# Patient Record
Sex: FEMALE | Race: WHITE | Employment: UNEMPLOYED | URBAN - METROPOLITAN AREA
[De-identification: names, ages, dates, MRNs, and addresses within clinical notes are randomized per-mention and may not be internally consistent; named-entity substitution may affect disease eponyms.]

---

## 2017-03-15 PROBLEM — G44.40 ANALGESIC REBOUND HEADACHE: Status: ACTIVE | Noted: 2017-03-15

## 2017-03-15 PROBLEM — Z79.899 ENCOUNTER FOR MEDICATION MANAGEMENT: Status: ACTIVE | Noted: 2017-03-15

## 2017-03-15 PROBLEM — T39.95XA ANALGESIC REBOUND HEADACHE: Status: ACTIVE | Noted: 2017-03-15

## 2017-03-15 PROBLEM — G89.29 CHRONIC INTRACTABLE HEADACHE: Status: ACTIVE | Noted: 2017-03-15

## 2017-03-15 PROBLEM — G43.119 INTRACTABLE MIGRAINE WITH AURA WITHOUT STATUS MIGRAINOSUS: Status: ACTIVE | Noted: 2017-03-15

## 2017-03-15 PROBLEM — R51.9 CHRONIC INTRACTABLE HEADACHE: Status: ACTIVE | Noted: 2017-03-15

## 2017-06-15 ENCOUNTER — HOSPITAL ENCOUNTER (OUTPATIENT)
Dept: MAMMOGRAPHY | Age: 52
Discharge: HOME | End: 2017-06-15
Attending: OBSTETRICS & GYNECOLOGY
Payer: COMMERCIAL

## 2017-06-15 DIAGNOSIS — Z12.39 SCREENING FOR BREAST CANCER: ICD-10-CM

## 2017-06-15 PROCEDURE — 77067 SCR MAMMO BI INCL CAD: CPT

## 2017-07-25 ENCOUNTER — HOSPITAL ENCOUNTER (OUTPATIENT)
Dept: PHYSICAL THERAPY | Age: 52
Discharge: HOME | End: 2017-07-25
Payer: COMMERCIAL

## 2017-07-25 DIAGNOSIS — G44.221 CHRONIC TENSION-TYPE HEADACHE, INTRACTABLE: ICD-10-CM

## 2017-07-25 DIAGNOSIS — M47.812 CERVICAL SPONDYLOSIS WITHOUT MYELOPATHY: ICD-10-CM

## 2017-07-25 DIAGNOSIS — M54.12 CERVICAL NEUROPATHIC PAIN: ICD-10-CM

## 2017-07-25 DIAGNOSIS — G43.119 INTRACTABLE MIGRAINE WITH AURA WITHOUT STATUS MIGRAINOSUS: ICD-10-CM

## 2017-07-25 DIAGNOSIS — M54.2 CERVICALGIA: ICD-10-CM

## 2017-07-25 DIAGNOSIS — M47.812 SPONDYLOSIS OF CERVICAL REGION WITHOUT MYELOPATHY OR RADICULOPATHY: ICD-10-CM

## 2017-07-25 DIAGNOSIS — M54.2 DISCOGENIC CERVICAL PAIN: ICD-10-CM

## 2017-07-25 DIAGNOSIS — M48.02 SPINAL STENOSIS OF CERVICAL REGION: ICD-10-CM

## 2017-07-25 DIAGNOSIS — M50.30 DDD (DEGENERATIVE DISC DISEASE), CERVICAL: ICD-10-CM

## 2017-07-25 DIAGNOSIS — R29.898 WEAKNESS OF LEFT ARM: ICD-10-CM

## 2017-07-25 PROCEDURE — 97110 THERAPEUTIC EXERCISES: CPT

## 2017-07-25 PROCEDURE — 97162 PT EVAL MOD COMPLEX 30 MIN: CPT

## 2017-07-25 NOTE — PROGRESS NOTES
Claire Lyons  : [id number] [facility],[address]  [address] [place] [address].  [phone]   [phone]        OUTPATIENT PHYSICAL THERAPY:Initial Assessment 2017      ICD-10: Treatment Diagnosis: Cervicalgia (M54.2) , Headache (R51)  Precautions/Allergies:   Pcn [penicillins] and Shellfish derived   Fall Risk Score: 0 (? 5 = High Risk)  MD Orders: Eval and Treat  MEDICAL/REFERRING DIAGNOSIS:  Intractable migraine with aura without status migrainosus [G43.119]  DDD (degenerative disc disease), cervical [M50.30]  Discogenic cervical pain [M54.2]  Cervicalgia [M54.2]  Cervical spondylosis without myelopathy [M47.812]  Spondylosis of cervical region without myelopathy or radiculopathy [M47.812]  Spinal stenosis of cervical region [M48.02]  Cervical neuropathic pain [M54.12]  Weakness of left arm [R29.898]  Chronic tension-type headache, intractable [G44.221]   DATE OF ONSET: 2 years ago   REFERRING PHYSICIAN: Yani Dash*  RETURN PHYSICIAN APPOINTMENT: tbd      INITIAL ASSESSMENT:  Ms. Asim Lozano presents to therapy with pain in the neck and shoulders as well as recurring chronic migraines that are debilitating. Pt is having trouble at times with daily activities due to the neck pain that causes the migraines to come on. Pt would benefit from skilled physical therapy for the above diagnosis to help with decreased tension, and to decrease the amount of episodes of migraines. PROBLEM LIST (Impacting functional limitations):  1. Decreased Strength  2. Decreased ADL/Functional Activities  3. Increased Pain  4. Decreased Activity Tolerance  5. Decreased Flexibility/Joint Mobility INTERVENTIONS PLANNED:  1. Cold  2. Electrical Stimulation  3. Heat  4. Home Exercise Program (HEP)  5. Manual Therapy  6. Range of Motion (ROM)  7. Therapeutic Exercise/Strengthening  8. Ultrasound (US)   TREATMENT PLAN:  Effective Dates: 17 TO 9-15-17. Frequency/Duration: 2 times a week for 7 weeks  GOALS: (Goals have been discussed and agreed upon with patient.)  Short-Term Functional Goals: Time Frame: 2 weeks   1. Ms. Meseret Caputo to be independent with HEP. 2. Pt able to tolerate maintaining proper posture in sitting without VCs and no increase in pain in the neck or shoulders for >30 mins. 3. Pt to improve cervical ROM without pain to improve driving safety. Discharge Goals: Time Frame: 7 weeks   1. Pt able to tolerate daily functional activities without pain greater than 3/10 in the neck or shoulders. 2. Pt able to sleep throughout night without waking from pain in the head or neck. 3. Pt to report less debilitating migraines to less than 2 days. Rehabilitation Potential For Stated Goals: Fair  Regarding Claire NARAYANAN Eloy's therapy, I certify that the treatment plan above will be carried out by a therapist or under their direction. Thank you for this referral,  Denia Camacho, PT, DPT       Referring Physician Signature: Amena Meier*              Date                      HISTORY:   History of Present Injury/Illness (Reason for Referral):  Pt 49 y/o F with pain in the neck and head with migraines that were debilitating causing her to go out of work on disability 2 years ago. She has been getting injections in her neck and has had therapy before 2 years ago. She is still seeing pain management for the injections. She is getting migraines about 1-2 times a week and is lasting 3-4 days at a time. She is currently out of work   Past Medical History/Comorbidities:   Ms. Meseret Caputo  has a past medical history of Anxiety attack; Borderline personality disorder (10/8/2014); Chicken pox; Cystocele; Esophageal reflux (10/8/2014); Hypercholesteremia; Measles; Migraine with aura, without mention of intractable migraine without mention of status migrainosus (10/8/2014); Migraines; Morbid obesity ([address] Mumps; Ovarian cyst; Pneumonia;  Rectocele; Stress incontinence (2007); Symptomatic menopausal or female climacteric states (10/8/2014); Unspecified essential hypertension (10/8/2014); Unspecified hypothyroidism; and Urge incontinence (2007). Ms. Johanny Carter  has a past surgical history that includes urological (2007); gastrectomy (02/06/12); hysterectomy (1998); salpingo-oophorectomy (2003); oophorectomy (2003); hysterectomy (2000); orthopaedic; and orthopaedic (04/2015). Social History/Living Environment:    Lives alone   Prior Level of Function/Work/Activity:  Working toward long term disability   Dominant Side:         RIGHT  Other Clinical Tests:          xrays and MRIs have been done previously   Previous Treatment Approaches:          Therapy 2 years ago for neck pain   Personal Factors:          Past/Current Experience:  Migraines for the past 2 years   Current Medications:    Current Outpatient Prescriptions:     verapamil ER (VERELAN PM) 100 mg capsule, Take 1 Cap by mouth nightly., Disp: 90 Cap, Rfl: 1    estradiol acetate (FEMRING) 0.05 mg/24 hr ring, Insert 1 Units into vagina every three (3) months., Disp: 1 Each, Rfl: 4    ezetimibe-simvastatin (VYTORIN) 10-20 mg per tablet, Take 1 Tab by mouth nightly., Disp: 90 Tab, Rfl: 1    SYNTHROID 125 mcg tablet, Take 1 Tab by mouth Daily (before breakfast). GEMINI, Disp: 90 Tab, Rfl: 1    potassium chloride (KLOR-CON M20) 20 mEq tablet, Take 1 Tab by mouth two (2) times a day., Disp: 180 Tab, Rfl: 1    indapamide (LOZOL) 2.5 mg tablet, Take 1 Tab by mouth every morning., Disp: 90 Tab, Rfl: 1    colestipol (COLESTID) 1 gram tablet, Take 1 Tab by mouth two (2) times a day., Disp: 180 Tab, Rfl: 1    propranolol LA (INDERAL LA) 80 mg SR capsule, Take 1 Cap by mouth daily. , Disp: 90 Cap, Rfl: 1    LORazepam (ATIVAN) 1 mg tablet, 1-2 daily prn, Disp: 45 Tab, Rfl: 5    nortriptyline (PAMELOR) 25 mg capsule, 1 po QPM  Indications: NEUROPATHIC PAIN, Disp: 90 Cap, Rfl: 1    nystatin-triamcinolone (MYCOLOG II) topical cream, Apply  to affected area two (2) times a day., Disp: 30 g, Rfl: 3    butalbital-acetaminophen-caffeine (ESGIC PLUS) -40 mg per tablet, Take 1 Tab by mouth two (2) times a day. Indications: MIGRAINE, TENSION-TYPE HEADACHE, Disp: , Rfl:     tiZANidine (ZANAFLEX) 2 mg tablet, Take 2 mg by mouth nightly as needed. Takes 1-2 tablets as needed at bedtime, Disp: , Rfl:     SUMAtriptan Succinate (SUMAVEL DOSEPRO) 6 mg/0.5 mL nfIj, Inject SC at onset of migraine. Repeat dose after 2 hrs if needed. Max 2 injections/24 hrs. Indications: MIGRAINE, Disp: 18 Syringe, Rfl: 1    eletriptan (RELPAX) 40 mg tablet, 1 po at onset of migraine and repeat after 2 hrs. If needed. Max dose 2 tabs/24 hrs., Disp: 18 Tab, Rfl: 1    HYDROcodone-acetaminophen (NORCO) 5-325 mg per tablet, Take 1 Tab by mouth every four (4) hours as needed for Pain., Disp: , Rfl:     Diclofenac Potassium (CAMBIA) 50 mg pwpk, Mix 1 packet in 1-2 oz of water and drink at onset of migraine (max: 1 packet/day)  Indications: MIGRAINE, Disp: 9 Packet, Rfl: 11    triamcinolone (NASACORT AQ) 55 mcg nasal inhaler, , Disp: , Rfl: 1    promethazine (PHENERGAN) 25 mg tablet, Take 1 Tab by mouth every eight (8) hours as needed for Nausea., Disp: 30 Tab, Rfl: 2    multivitamin (ONE A DAY) tablet, Take 1 Tab by mouth daily. Last dose 4 days ago, Disp: , Rfl:     aspirin 81 mg tablet, Take 81 mg by mouth daily. Last dose 5 days ago, Disp: , Rfl:     vitamin e (E GEMS) 1,000 unit capsule, Take 1,000 Units by mouth daily. Last dose 4 days ago, Disp: , Rfl:     calcium citrate-vitamin d3 (CITRACAL + D) 315-200 mg-unit Tab, Take 1 Tab by mouth daily (with breakfast). , Disp: , Rfl:    Date Last Reviewed:  7/25/2017   # of Personal Factors/Comorbidities that affect the Plan of Care: 3+: HIGH COMPLEXITY   EXAMINATION:   Observation/Orthostatic Postural Assessment:          Rounded shoulders, forward head (not significant)  Palpation: Tenderness over the T1 region and tension in shoulders   ROM:     Cervical       Flexion: decreased ROM with pain       Extension: WFL      R rotation: slightly limited with pain at end range      L rotation: slightly limited with pain at end range       R SB: limited and slightly painful       L SB: limited and slightly painful    Shoulder   Strength:     Cervical       Flexion: 4/5      Extension: 4-/5      R SB: 4/5      L SB: 4/5      R rotation:4/5      L rotation: 4/5   Shoulder    Special Tests:          Compression (-), Spurlings (-), Distraction (+)  Neurological Screen:        Sensation: no compliant of numbness or tingling   Functional Mobility:         Independent unless with migraine   Balance:          Good    Body Structures Involved:  1. Muscles Body Functions Affected:  1. Mental  2. Sensory/Pain  3. Neuromusculoskeletal  4. Movement Related Activities and Participation Affected:  1. General Tasks and Demands  2. Mobility  3. Community, Social and [place]   # of elements that affect the Plan of Care: 4+: HIGH COMPLEXITY   CLINICAL PRESENTATION:   Presentation: Evolving clinical presentation with changing clinical characteristics: MODERATE COMPLEXITY   CLINICAL DECISION MAKING:   Outcome Measure: Tool Used: Neck Disability Index (NDI)  Score:  Initial: 29/50  Most Recent: X/50 (Date: -- )   Interpretation of Score: The Neck Disability Index is a revised form of the Oswestry Low Back Pain Index and is designed to measure the activities of daily living in person's with neck pain. Each section is scored on a 0-5 scale, 5 representing the greatest disability. The scores of each section are added together for a total score of 50. Score 0 1-10 11-20 21-30 31-40 41-49 50   Modifier CH CI CJ CK CL CM CN     Medical Necessity:   · Patient is expected to demonstrate progress in strength and range of motion to increase independence with functional activities daily with less pain.  .  Reason for Services/Other Comments:  · Patient continues to require present interventions due to patient's inability to perform functional activities without neck pain. .   Use of outcome tool(s) and clinical judgement create a POC that gives a: Difficult prediction of patient's progress: HIGH COMPLEXITY   TREATMENT:   (In addition to Assessment/Re-Assessment sessions the following treatments were rendered)  THERAPEUTIC EXERCISE: (see below for minutes):  Exercises per grid below to improve mobility and strength. Required minimal verbal cues to promote proper body alignment. Progressed resistance, range and repetitions as indicated. MANUAL THERAPY: (see below for minutes): Joint mobilization, Soft tissue mobilization and Manipulation was utilized and necessary because of the patient's restricted joint motion, painful spasm and restricted motion of soft tissue. MODALITIES: (see below for minutes):      see chart below    MECHANICAL TRACTION: (see below for minutes): Traction was used due to the patient's neck pain and migraine headaches in order to relieve pain in or originating from his spine. Date: 7-25-17       Modalities:                                Therapeutic Exercise: 15 mins        Upper trap stretch  x15SH        Levator stretch  x15SH        Rotation stretch  5SH bilateral                                Proprioceptive Activities:                                Manual Therapy:                        Functional Activities:                                        HEP: General stretching and ROM in the neck was recommended daily. Pt was educated on TENS unit and was told to purchase more supportive pillow and to use a towel roll under neck until. Treatment/Session Assessment:  Pt was ordered a FCE but needs to complete therapy prior to being referred for the FCE. Pt was informed of this as well as purchased a TENS unit and educated on use at home.  Pt would benefit from manual therapy, stretching and strengthening for posture as well as modalities and traction. · Pain/ Symptoms: Initial:   0/10 at this moment  Post Session:  0/10  ·   Compliance with Program/Exercises: Will assess as treatment progresses. · Recommendations/Intent for next treatment session: \"Next visit will focus on advancements to more challenging activities\".   Total Treatment Duration:  PT Patient Time In/Time Out  Time In: 0200  Time Out: 0245 August Elliott, PT, DPT

## 2017-07-25 NOTE — PROGRESS NOTES
Ambulatory/Rehab Services H2 Model Falls Risk Assessment    Risk Factor Pts. ·   Confusion/Disorientation/Impulsivity  []    4 ·   Symptomatic Depression  []   2 ·   Altered Elimination  []   1 ·   Dizziness/Vertigo  []   1 ·   Gender (Male)  []   1 ·   Any administered antiepileptics (anticonvulsants):  []   2 ·   Any administered benzodiazepines:  []   1 ·   Visual Impairment (specify):  []   1 ·   Portable Oxygen Use  []   1 ·   Orthostatic ? BP  []   1 ·   History of Recent Falls (within 3 mos.)  []   5     Ability to Rise from Chair (choose one) Pts. ·   Ability to rise in a single movement  [x]   0 ·   Pushes up, successful in one attempt  []   1 ·   Multiple attempts, but successful  []   3 ·   Unable to rise without assistance  []   4   Total: (5 or greater = High Risk) 0     Falls Prevention Plan:   []                Physical Limitations to Exercise (specify):   []                Mobility Assistance Device (type):   []                Exercise/Equipment Adaptation (specify):    ©2010 [facility] [address] Patent #[id number].  Federal Law prohibits the replication, distribution or use without written permission from [facility] [employer]

## 2017-07-27 ENCOUNTER — HOSPITAL ENCOUNTER (OUTPATIENT)
Dept: PHYSICAL THERAPY | Age: 52
Discharge: HOME | End: 2017-07-27
Payer: COMMERCIAL

## 2017-08-02 ENCOUNTER — HOSPITAL ENCOUNTER (OUTPATIENT)
Dept: PHYSICAL THERAPY | Age: 52
Discharge: HOME | End: 2017-08-02
Payer: COMMERCIAL

## 2017-08-02 PROCEDURE — 97110 THERAPEUTIC EXERCISES: CPT

## 2017-08-02 PROCEDURE — 97012 MECHANICAL TRACTION THERAPY: CPT

## 2017-08-02 PROCEDURE — 97014 ELECTRIC STIMULATION THERAPY: CPT

## 2017-08-02 NOTE — PROGRESS NOTES
Claire Lyons  : [id number] [facility],[address]  [address] [place] [address]  [phone]   [phone]        OUTPATIENT PHYSICAL THERAPY:Daily Note 2017      ICD-10: Treatment Diagnosis: Cervicalgia (M54.2) , Headache (R51)  Precautions/Allergies:   Pcn [penicillins] and Shellfish derived   Fall Risk Score: 0 (? 5 = High Risk)  MD Orders: Eval and Treat  MEDICAL/REFERRING DIAGNOSIS:  Migraines [G43.909]  Neck pain [M54.2]   DATE OF ONSET: 2 years ago   REFERRING PHYSICIAN: Yani Dash*  RETURN PHYSICIAN APPOINTMENT: [email or web address]      INITIAL ASSESSMENT:  Ms. Cheko Leslie presents to therapy with pain in the neck and shoulders as well as recurring chronic migraines that are debilitating. Pt is having trouble at times with daily activities due to the neck pain that causes the migraines to come on. Pt would benefit from skilled physical therapy for the above diagnosis to help with decreased tension, and to decrease the amount of episodes of migraines. PROBLEM LIST (Impacting functional limitations):  1. Decreased Strength  2. Decreased ADL/Functional Activities  3. Increased Pain  4. Decreased Activity Tolerance  5. Decreased Flexibility/Joint Mobility INTERVENTIONS PLANNED:  1. Cold  2. Electrical Stimulation  3. Heat  4. Home Exercise Program (HEP)  5. Manual Therapy  6. Range of Motion (ROM)  7. Therapeutic Exercise/Strengthening  8. Ultrasound (US)   TREATMENT PLAN:  Effective Dates: 17 TO 9-15-17. Frequency/Duration: 2 times a week for 7 weeks  GOALS: (Goals have been discussed and agreed upon with patient.)  Short-Term Functional Goals: Time Frame: 2 weeks   1. Ms. Cheko Leslie to be independent with HEP. 2. Pt able to tolerate maintaining proper posture in sitting without VCs and no increase in pain in the neck or shoulders for >30 mins. 3. Pt to improve cervical ROM without pain to improve driving safety.    Discharge Goals: Time Frame: 7 weeks   1. Pt able to tolerate daily functional activities without pain greater than 3/10 in the neck or shoulders. 2. Pt able to sleep throughout night without waking from pain in the head or neck. 3. Pt to report less debilitating migraines to less than 2 days. Rehabilitation Potential For Stated Goals: Fair                HISTORY:   History of Present Injury/Illness (Reason for Referral):  Pt 49 y/o F with pain in the neck and head with migraines that were debilitating causing her to go out of work on disability 2 years ago. She has been getting injections in her neck and has had therapy before 2 years ago. She is still seeing pain management for the injections. She is getting migraines about 1-2 times a week and is lasting 3-4 days at a time. She is currently out of work   Past Medical History/Comorbidities:   Ms. Susan Miranda  has a past medical history of Anxiety attack; Borderline personality disorder (10/8/2014); Chicken pox; Cystocele; Esophageal reflux (10/8/2014); Hypercholesteremia; Measles; Migraine with aura, without mention of intractable migraine without mention of status migrainosus (10/8/2014); Migraines; Morbid obesity ([facility] [address] Mumps; Ovarian cyst; Pneumonia; Rectocele; Stress incontinence (2007); Symptomatic menopausal or female climacteric states (10/8/2014); Unspecified essential hypertension (10/8/2014); Unspecified hypothyroidism; and Urge incontinence (2007). Ms. Susan Miranda  has a past surgical history that includes urological (2007); gastrectomy (02/06/12); hysterectomy (1998); salpingo-oophorectomy (2003); oophorectomy (2003); hysterectomy (2000); orthopaedic; and orthopaedic (04/2015).   Social History/Living Environment:    Lives alone   Prior Level of Function/Work/Activity:  Working toward long term disability   Dominant Side:         RIGHT  Other Clinical Tests:          xrays and MRIs have been done previously   Previous Treatment Approaches:          Therapy 2 years ago for neck pain   Personal Factors:          Past/Current Experience:  Migraines for the past 2 years   Current Medications:    Current Outpatient Prescriptions:     verapamil ER (VERELAN PM) 100 mg capsule, Take 1 Cap by mouth nightly., Disp: 90 Cap, Rfl: 1    estradiol acetate (FEMRING) 0.05 mg/24 hr ring, Insert 1 Units into vagina every three (3) months., Disp: 1 Each, Rfl: 4    ezetimibe-simvastatin (VYTORIN) 10-20 mg per tablet, Take 1 Tab by mouth nightly., Disp: 90 Tab, Rfl: 1    SYNTHROID 125 mcg tablet, Take 1 Tab by mouth Daily (before breakfast). GEMINI, Disp: 90 Tab, Rfl: 1    potassium chloride (KLOR-CON M20) 20 mEq tablet, Take 1 Tab by mouth two (2) times a day., Disp: 180 Tab, Rfl: 1    indapamide (LOZOL) 2.5 mg tablet, Take 1 Tab by mouth every morning., Disp: 90 Tab, Rfl: 1    colestipol (COLESTID) 1 gram tablet, Take 1 Tab by mouth two (2) times a day., Disp: 180 Tab, Rfl: 1    propranolol LA (INDERAL LA) 80 mg SR capsule, Take 1 Cap by mouth daily. , Disp: 90 Cap, Rfl: 1    LORazepam (ATIVAN) 1 mg tablet, 1-2 daily prn, Disp: 45 Tab, Rfl: 5    nortriptyline (PAMELOR) 25 mg capsule, 1 po QPM  Indications: NEUROPATHIC PAIN, Disp: 90 Cap, Rfl: 1    nystatin-triamcinolone (MYCOLOG II) topical cream, Apply  to affected area two (2) times a day., Disp: 30 g, Rfl: 3    butalbital-acetaminophen-caffeine (ESGIC PLUS) -40 mg per tablet, Take 1 Tab by mouth two (2) times a day. Indications: MIGRAINE, TENSION-TYPE HEADACHE, Disp: , Rfl:     tiZANidine (ZANAFLEX) 2 mg tablet, Take 2 mg by mouth nightly as needed. Takes 1-2 tablets as needed at bedtime, Disp: , Rfl:     SUMAtriptan Succinate (SUMAVEL DOSEPRO) 6 mg/0.5 mL nfIj, Inject SC at onset of migraine. Repeat dose after 2 hrs if needed. Max 2 injections/24 hrs. Indications: MIGRAINE, Disp: 18 Syringe, Rfl: 1    eletriptan (RELPAX) 40 mg tablet, 1 po at onset of migraine and repeat after 2 hrs. If needed.   Max dose 2 tabs/24 hrs., Disp: 18 Tab, Rfl: 1    HYDROcodone-acetaminophen (NORCO) 5-325 mg per tablet, Take 1 Tab by mouth every four (4) hours as needed for Pain., Disp: , Rfl:     Diclofenac Potassium (CAMBIA) 50 mg pwpk, Mix 1 packet in 1-2 oz of water and drink at onset of migraine (max: 1 packet/day)  Indications: MIGRAINE, Disp: 9 Packet, Rfl: 11    triamcinolone (NASACORT AQ) 55 mcg nasal inhaler, , Disp: , Rfl: 1    promethazine (PHENERGAN) 25 mg tablet, Take 1 Tab by mouth every eight (8) hours as needed for Nausea., Disp: 30 Tab, Rfl: 2    multivitamin (ONE A DAY) tablet, Take 1 Tab by mouth daily. Last dose 4 days ago, Disp: , Rfl:     aspirin 81 mg tablet, Take 81 mg by mouth daily. Last dose 5 days ago, Disp: , Rfl:     vitamin e (E GEMS) 1,000 unit capsule, Take 1,000 Units by mouth daily. Last dose 4 days ago, Disp: , Rfl:     calcium citrate-vitamin d3 (CITRACAL + D) 315-200 mg-unit Tab, Take 1 Tab by mouth daily (with breakfast). , Disp: , Rfl:    Date Last Reviewed:  8/2/2017   EXAMINATION:   Observation/Orthostatic Postural Assessment:          Rounded shoulders, forward head (not significant)  Palpation:          Tenderness over the T1 region and tension in shoulders   ROM:     Cervical       Flexion: decreased ROM with pain       Extension: WFL      R rotation: slightly limited with pain at end range      L rotation: slightly limited with pain at end range       R SB: limited and slightly painful       L SB: limited and slightly painful    Shoulder   Strength:     Cervical       Flexion: 4/5      Extension: 4-/5      R SB: 4/5      L SB: 4/5      R rotation:4/5      L rotation: 4/5   Shoulder    Special Tests:          Compression (-), Spurlings (-), Distraction (+)  Neurological Screen:        Sensation: no compliant of numbness or tingling   Functional Mobility:         Independent unless with migraine   Balance:          Good    Body Structures Involved:  1.  Muscles Body Functions Affected:  1. Mental  2. Sensory/Pain  3. Neuromusculoskeletal  4. Movement Related Activities and Participation Affected:  1. General Tasks and Demands  2. Mobility  3. Community, Social and Civic Life   CLINICAL PRESENTATION:   CLINICAL DECISION MAKING:   Outcome Measure: Tool Used: Neck Disability Index (NDI)  Score:  Initial: 29/50  Most Recent: X/50 (Date: -- )   Interpretation of Score: The Neck Disability Index is a revised form of the Oswestry Low Back Pain Index and is designed to measure the activities of daily living in person's with neck pain. Each section is scored on a 0-5 scale, 5 representing the greatest disability. The scores of each section are added together for a total score of 50. Score 0 1-10 11-20 21-30 31-40 41-49 50   Modifier CH CI CJ CK CL CM CN     Medical Necessity:   · Patient is expected to demonstrate progress in strength and range of motion to increase independence with functional activities daily with less pain. .  Reason for Services/Other Comments:  · Patient continues to require present interventions due to patient's inability to perform functional activities without neck pain. Jeanne Kennedy TREATMENT:   (In addition to Assessment/Re-Assessment sessions the following treatments were rendered)  THERAPEUTIC EXERCISE: (see below for minutes):  Exercises per grid below to improve mobility and strength. Required minimal verbal cues to promote proper body alignment. Progressed resistance, range and repetitions as indicated. MANUAL THERAPY: (see below for minutes): Joint mobilization, Soft tissue mobilization and Manipulation was utilized and necessary because of the patient's restricted joint motion, painful spasm and restricted motion of soft tissue.    MODALITIES: (see below for minutes):      see chart below    MECHANICAL TRACTION: (see below for minutes): Traction was used due to the patient's neck pain and migraine headaches in order to relieve pain in or originating from his spine.    Date: 7-25-17 08/02/17 (visit 2)      Modalities:  30  min      MHP with IFC  15 min at beginning of treatment      Traction  15 min up to 14# pull for muscular stretching at end of session              Therapeutic Exercise: 15 mins  25 min      Upper trap stretch  x15SH  [id number] B manuall      Levator stretch  x15SH  [id number] B manually      Rotation stretch  5SH bilateral  [id number] B manually      Chin tuck  [id number]      Alternating isometrics  Into cervical rotation [id number]              Proprioceptive Activities:                                Manual Therapy:  5 min      Soft tissue mobilization  To cervical paraspinals x 5 min              Functional Activities:                                        HEP: General stretching and ROM in the neck was recommended daily. Pt was educated on TENS unit and was told to purchase more supportive pillow and to use a towel roll under neck until. Treatment/Session Assessment:  Significant tightness is noted with stretching into lateral flexion as well as into rotation, however this is nonprovocative. · Pain/ Symptoms: Initial:   0/10 \"im feeling fine today. I had a migrane that lasted 3 days last week. That's pretty typical that they will last for that long. \" Post Session:  0/10  ·   Compliance with Program/Exercises: Will assess as treatment progresses. · Recommendations/Intent for next treatment session: \"Next visit will focus on advancements to more challenging activities\".   Total Treatment Duration:  PT Patient Time In/Time Out  Time In: 0930  Time Out: [address], POOJAT

## 2017-08-09 ENCOUNTER — HOSPITAL ENCOUNTER (OUTPATIENT)
Dept: PHYSICAL THERAPY | Age: 52
Discharge: HOME | End: 2017-08-09
Payer: COMMERCIAL

## 2017-08-09 PROCEDURE — 97014 ELECTRIC STIMULATION THERAPY: CPT

## 2017-08-09 PROCEDURE — 97140 MANUAL THERAPY 1/> REGIONS: CPT

## 2017-08-09 PROCEDURE — 97012 MECHANICAL TRACTION THERAPY: CPT

## 2017-08-09 NOTE — PROGRESS NOTES
Claire Lyons  : [id number] [facility] [address]  [address] [place] [address].  [phone]   [phone]        OUTPATIENT PHYSICAL THERAPY:Daily Note 2017      ICD-10: Treatment Diagnosis: Cervicalgia (M54.2) , Headache (R51)  Precautions/Allergies:   Pcn [penicillins] and Shellfish derived   Fall Risk Score: 0 (? 5 = High Risk)  MD Orders: Eval and Treat  MEDICAL/REFERRING DIAGNOSIS:  Migraines [G43.909]  Neck pain [M54.2]   DATE OF ONSET: 2 years ago   REFERRING PHYSICIAN: Yani Dash*  RETURN PHYSICIAN APPOINTMENT: [email or web address]      INITIAL ASSESSMENT:  Ms. Estela Heath presents to therapy with pain in the neck and shoulders as well as recurring chronic migraines that are debilitating. Pt is having trouble at times with daily activities due to the neck pain that causes the migraines to come on. Pt would benefit from skilled physical therapy for the above diagnosis to help with decreased tension, and to decrease the amount of episodes of migraines. PROBLEM LIST (Impacting functional limitations):  1. Decreased Strength  2. Decreased ADL/Functional Activities  3. Increased Pain  4. Decreased Activity Tolerance  5. Decreased Flexibility/Joint Mobility INTERVENTIONS PLANNED:  1. Cold  2. Electrical Stimulation  3. Heat  4. Home Exercise Program (HEP)  5. Manual Therapy  6. Range of Motion (ROM)  7. Therapeutic Exercise/Strengthening  8. Ultrasound (US)   TREATMENT PLAN:  Effective Dates: 17 TO 9-15-17. Frequency/Duration: 2 times a week for 7 weeks  GOALS: (Goals have been discussed and agreed upon with patient.)  Short-Term Functional Goals: Time Frame: 2 weeks   1. Ms. Estela Heath to be independent with HEP. 2. Pt able to tolerate maintaining proper posture in sitting without VCs and no increase in pain in the neck or shoulders for >30 mins. 3. Pt to improve cervical ROM without pain to improve driving safety.    Discharge Goals: Time Frame: 7 weeks   1. Pt able to tolerate daily functional activities without pain greater than 3/10 in the neck or shoulders. 2. Pt able to sleep throughout night without waking from pain in the head or neck. 3. Pt to report less debilitating migraines to less than 2 days. Rehabilitation Potential For Stated Goals: Fair                HISTORY:   History of Present Injury/Illness (Reason for Referral):  Pt 51 y/o F with pain in the neck and head with migraines that were debilitating causing her to go out of work on disability 2 years ago. She has been getting injections in her neck and has had therapy before 2 years ago. She is still seeing pain management for the injections. She is getting migraines about 1-2 times a week and is lasting 3-4 days at a time. She is currently out of work   Past Medical History/Comorbidities:   Ms. Federica Ponce  has a past medical history of Anxiety attack; Borderline personality disorder (10/8/2014); Chicken pox; Cystocele; Esophageal reflux (10/8/2014); Hypercholesteremia; Measles; Migraine with aura, without mention of intractable migraine without mention of status migrainosus (10/8/2014); Migraines; Morbid obesity ([facility] [address] Mumps; Ovarian cyst; Pneumonia; Rectocele; Stress incontinence (2007); Symptomatic menopausal or female climacteric states (10/8/2014); Unspecified essential hypertension (10/8/2014); Unspecified hypothyroidism; and Urge incontinence (2007). Ms. Federica Ponce  has a past surgical history that includes urological (2007); gastrectomy (02/06/12); hysterectomy (1998); salpingo-oophorectomy (2003); oophorectomy (2003); hysterectomy (2000); orthopaedic; and orthopaedic (04/2015).   Social History/Living Environment:    Lives alone   Prior Level of Function/Work/Activity:  Working toward long term disability   Dominant Side:         RIGHT  Other Clinical Tests:          xrays and MRIs have been done previously   Previous Treatment Approaches:          Therapy 2 years ago for neck pain   Personal Factors:          Past/Current Experience:  Migraines for the past 2 years   Current Medications:    Current Outpatient Prescriptions:     verapamil ER (VERELAN PM) 100 mg capsule, Take 1 Cap by mouth nightly., Disp: 90 Cap, Rfl: 1    estradiol acetate (FEMRING) 0.05 mg/24 hr ring, Insert 1 Units into vagina every three (3) months., Disp: 1 Each, Rfl: 4    ezetimibe-simvastatin (VYTORIN) 10-20 mg per tablet, Take 1 Tab by mouth nightly., Disp: 90 Tab, Rfl: 1    SYNTHROID 125 mcg tablet, Take 1 Tab by mouth Daily (before breakfast). GEMINI, Disp: 90 Tab, Rfl: 1    potassium chloride (KLOR-CON M20) 20 mEq tablet, Take 1 Tab by mouth two (2) times a day., Disp: 180 Tab, Rfl: 1    indapamide (LOZOL) 2.5 mg tablet, Take 1 Tab by mouth every morning., Disp: 90 Tab, Rfl: 1    colestipol (COLESTID) 1 gram tablet, Take 1 Tab by mouth two (2) times a day., Disp: 180 Tab, Rfl: 1    propranolol LA (INDERAL LA) 80 mg SR capsule, Take 1 Cap by mouth daily. , Disp: 90 Cap, Rfl: 1    LORazepam (ATIVAN) 1 mg tablet, 1-2 daily prn, Disp: 45 Tab, Rfl: 5    nortriptyline (PAMELOR) 25 mg capsule, 1 po QPM  Indications: NEUROPATHIC PAIN, Disp: 90 Cap, Rfl: 1    nystatin-triamcinolone (MYCOLOG II) topical cream, Apply  to affected area two (2) times a day., Disp: 30 g, Rfl: 3    butalbital-acetaminophen-caffeine (ESGIC PLUS) -40 mg per tablet, Take 1 Tab by mouth two (2) times a day. Indications: MIGRAINE, TENSION-TYPE HEADACHE, Disp: , Rfl:     tiZANidine (ZANAFLEX) 2 mg tablet, Take 2 mg by mouth nightly as needed. Takes 1-2 tablets as needed at bedtime, Disp: , Rfl:     SUMAtriptan Succinate (SUMAVEL DOSEPRO) 6 mg/0.5 mL nfIj, Inject SC at onset of migraine. Repeat dose after 2 hrs if needed. Max 2 injections/24 hrs. Indications: MIGRAINE, Disp: 18 Syringe, Rfl: 1    eletriptan (RELPAX) 40 mg tablet, 1 po at onset of migraine and repeat after 2 hrs. If needed.   Max dose 2 tabs/24 hrs., Disp: 18 Tab, Rfl: 1    HYDROcodone-acetaminophen (NORCO) 5-325 mg per tablet, Take 1 Tab by mouth every four (4) hours as needed for Pain., Disp: , Rfl:     Diclofenac Potassium (CAMBIA) 50 mg pwpk, Mix 1 packet in 1-2 oz of water and drink at onset of migraine (max: 1 packet/day)  Indications: MIGRAINE, Disp: 9 Packet, Rfl: 11    triamcinolone (NASACORT AQ) 55 mcg nasal inhaler, , Disp: , Rfl: 1    promethazine (PHENERGAN) 25 mg tablet, Take 1 Tab by mouth every eight (8) hours as needed for Nausea., Disp: 30 Tab, Rfl: 2    multivitamin (ONE A DAY) tablet, Take 1 Tab by mouth daily. Last dose 4 days ago, Disp: , Rfl:     aspirin 81 mg tablet, Take 81 mg by mouth daily. Last dose 5 days ago, Disp: , Rfl:     vitamin e (E GEMS) 1,000 unit capsule, Take 1,000 Units by mouth daily. Last dose 4 days ago, Disp: , Rfl:     calcium citrate-vitamin d3 (CITRACAL + D) 315-200 mg-unit Tab, Take 1 Tab by mouth daily (with breakfast). , Disp: , Rfl:    Date Last Reviewed:  8/9/2017   EXAMINATION:   Observation/Orthostatic Postural Assessment:          Rounded shoulders, forward head (not significant)  Palpation:          Tenderness over the T1 region and tension in shoulders   ROM:     Cervical       Flexion: decreased ROM with pain       Extension: WFL      R rotation: slightly limited with pain at end range      L rotation: slightly limited with pain at end range       R SB: limited and slightly painful       L SB: limited and slightly painful    Shoulder   Strength:     Cervical       Flexion: 4/5      Extension: 4-/5      R SB: 4/5      L SB: 4/5      R rotation:4/5      L rotation: 4/5   Shoulder    Special Tests:          Compression (-), Spurlings (-), Distraction (+)  Neurological Screen:        Sensation: no compliant of numbness or tingling   Functional Mobility:         Independent unless with migraine   Balance:          Good    Body Structures Involved:  1.  Muscles Body Functions Affected:  1. Mental  2. Sensory/Pain  3. Neuromusculoskeletal  4. Movement Related Activities and Participation Affected:  1. General Tasks and Demands  2. Mobility  3. Community, Social and Civic Life   CLINICAL PRESENTATION:   CLINICAL DECISION MAKING:   Outcome Measure: Tool Used: Neck Disability Index (NDI)  Score:  Initial: 29/50  Most Recent: X/50 (Date: -- )   Interpretation of Score: The Neck Disability Index is a revised form of the Oswestry Low Back Pain Index and is designed to measure the activities of daily living in person's with neck pain. Each section is scored on a 0-5 scale, 5 representing the greatest disability. The scores of each section are added together for a total score of 50. Score 0 1-10 11-20 21-30 31-40 41-49 50   Modifier CH CI CJ CK CL CM CN     Medical Necessity:   · Patient is expected to demonstrate progress in strength and range of motion to increase independence with functional activities daily with less pain. .  Reason for Services/Other Comments:  · Patient continues to require present interventions due to patient's inability to perform functional activities without neck pain. Milo Ontiveros TREATMENT:   (In addition to Assessment/Re-Assessment sessions the following treatments were rendered)  THERAPEUTIC EXERCISE: (see below for minutes):  Exercises per grid below to improve mobility and strength. Required minimal verbal cues to promote proper body alignment. Progressed resistance, range and repetitions as indicated. MANUAL THERAPY: (see below for minutes): Joint mobilization, Soft tissue mobilization and Manipulation was utilized and necessary because of the patient's restricted joint motion, painful spasm and restricted motion of soft tissue.    MODALITIES: (see below for minutes):      see chart below    MECHANICAL TRACTION: (see below for minutes): Traction was used due to the patient's neck pain and migraine headaches in order to relieve pain in or originating from his spine.    Date: 7-25-17 08/02/17 (visit 2) 8-9-17  (Visit 3)      Modalities:  30  min 30 mins      MHP with IFC  15 min at beginning of treatment 15 mins (8:45-9)     Traction  15 min up to 14# pull for muscular stretching at end of session 15 mins to 16# on 2nd level (9:15-9:30)             Therapeutic Exercise: 15 mins  25 min      Upper trap stretch  x15SH  [id number] B manuall      Levator stretch  x15SH  [id number] B manually      Rotation stretch  5SH bilateral  [id number] B manually      Chin tuck  [id number]      Alternating isometrics  Into cervical rotation [id number]              Proprioceptive Activities:                                Manual Therapy:  5 min 15 mins      Soft tissue mobilization  To cervical paraspinals x 5 min 15 mins to extensors and upper traps              Functional Activities:                                        HEP: General stretching and ROM in the neck was recommended daily. Pt was educated on TENS unit and was told to purchase more supportive pillow and to use a towel roll under neck until. Treatment/Session Assessment:  Pt tolerated all stretches and modalities without pain. Pt was told to continue with use of TENS unit at home as needed and to continue with the stretches given. · Pain/ Symptoms: Initial:   0/10 \"this last time I got a migraine it went away faster but then it came back.'  Post Session:  0/10  ·   Compliance with Program/Exercises: Will assess as treatment progresses. · Recommendations/Intent for next treatment session: \"Next visit will focus on advancements to more challenging activities\".   Total Treatment Duration:  PT Patient Time In/Time Out  Time In: 0845  Time Out: 0930     Hever Bhagat, PT, DPT

## 2017-08-16 ENCOUNTER — HOSPITAL ENCOUNTER (OUTPATIENT)
Dept: PHYSICAL THERAPY | Age: 52
Discharge: HOME | End: 2017-08-16
Payer: COMMERCIAL

## 2017-08-16 PROCEDURE — 97014 ELECTRIC STIMULATION THERAPY: CPT

## 2017-08-16 PROCEDURE — 97140 MANUAL THERAPY 1/> REGIONS: CPT

## 2017-08-16 NOTE — PROGRESS NOTES
Claire Lyons  : [id number] [facility] [address]  [address] [place] [address].  [phone]   [phone]        OUTPATIENT PHYSICAL THERAPY:Daily Note 2017      ICD-10: Treatment Diagnosis: Cervicalgia (M54.2) , Headache (R51)  Precautions/Allergies:   Pcn [penicillins] and Shellfish derived   Fall Risk Score: 0 (? 5 = High Risk)  MD Orders: Eval and Treat  MEDICAL/REFERRING DIAGNOSIS:  Migraines [G43.909]  Neck pain [M54.2]   DATE OF ONSET: 2 years ago   REFERRING PHYSICIAN: Yani Dash*  RETURN PHYSICIAN APPOINTMENT: freddie      INITIAL ASSESSMENT:  Ms. Meseret Caputo presents to therapy with pain in the neck and shoulders as well as recurring chronic migraines that are debilitating. Pt is having trouble at times with daily activities due to the neck pain that causes the migraines to come on. Pt would benefit from skilled physical therapy for the above diagnosis to help with decreased tension, and to decrease the amount of episodes of migraines. PROBLEM LIST (Impacting functional limitations):  1. Decreased Strength  2. Decreased ADL/Functional Activities  3. Increased Pain  4. Decreased Activity Tolerance  5. Decreased Flexibility/Joint Mobility INTERVENTIONS PLANNED:  1. Cold  2. Electrical Stimulation  3. Heat  4. Home Exercise Program (HEP)  5. Manual Therapy  6. Range of Motion (ROM)  7. Therapeutic Exercise/Strengthening  8. Ultrasound (US)   TREATMENT PLAN:  Effective Dates: 17 TO 9-15-17. Frequency/Duration: 2 times a week for 7 weeks  GOALS: (Goals have been discussed and agreed upon with patient.)  Short-Term Functional Goals: Time Frame: 2 weeks   1. Ms. Meseret Caputo to be independent with HEP. 2. Pt able to tolerate maintaining proper posture in sitting without VCs and no increase in pain in the neck or shoulders for >30 mins. 3. Pt to improve cervical ROM without pain to improve driving safety.    Discharge Goals: Time Frame: 7 weeks   1. Pt able to tolerate daily functional activities without pain greater than 3/10 in the neck or shoulders. 2. Pt able to sleep throughout night without waking from pain in the head or neck. 3. Pt to report less debilitating migraines to less than 2 days. Rehabilitation Potential For Stated Goals: Fair                HISTORY:   History of Present Injury/Illness (Reason for Referral):  Pt 51 y/o F with pain in the neck and head with migraines that were debilitating causing her to go out of work on disability 2 years ago. She has been getting injections in her neck and has had therapy before 2 years ago. She is still seeing pain management for the injections. She is getting migraines about 1-2 times a week and is lasting 3-4 days at a time. She is currently out of work   Past Medical History/Comorbidities:   Ms. Alyce Portillo  has a past medical history of Anxiety attack; Borderline personality disorder (10/8/2014); Chicken pox; Cystocele; Esophageal reflux (10/8/2014); Hypercholesteremia; Measles; Migraine with aura, without mention of intractable migraine without mention of status migrainosus (10/8/2014); Migraines; Morbid obesity ([facility] [address] Mumps; Ovarian cyst; Pneumonia; Rectocele; Stress incontinence (2007); Symptomatic menopausal or female climacteric states (10/8/2014); Unspecified essential hypertension (10/8/2014); Unspecified hypothyroidism; and Urge incontinence (2007). Ms. Alyce Portillo  has a past surgical history that includes urological (2007); gastrectomy (02/06/12); hysterectomy (1998); salpingo-oophorectomy (2003); oophorectomy (2003); hysterectomy (2000); orthopaedic; and orthopaedic (04/2015).   Social History/Living Environment:    Lives alone   Prior Level of Function/Work/Activity:  Working toward long term disability   Dominant Side:         RIGHT  Other Clinical Tests:          xrays and MRIs have been done previously   Previous Treatment Approaches:          Therapy 2 years ago for neck pain   Personal Factors:          Past/Current Experience:  Migraines for the past 2 years   Current Medications:    Current Outpatient Prescriptions:     verapamil ER (VERELAN PM) 100 mg capsule, Take 1 Cap by mouth nightly., Disp: 90 Cap, Rfl: 1    estradiol acetate (FEMRING) 0.05 mg/24 hr ring, Insert 1 Units into vagina every three (3) months., Disp: 1 Each, Rfl: 4    ezetimibe-simvastatin (VYTORIN) 10-20 mg per tablet, Take 1 Tab by mouth nightly., Disp: 90 Tab, Rfl: 1    SYNTHROID 125 mcg tablet, Take 1 Tab by mouth Daily (before breakfast). GEMINI, Disp: 90 Tab, Rfl: 1    potassium chloride (KLOR-CON M20) 20 mEq tablet, Take 1 Tab by mouth two (2) times a day., Disp: 180 Tab, Rfl: 1    indapamide (LOZOL) 2.5 mg tablet, Take 1 Tab by mouth every morning., Disp: 90 Tab, Rfl: 1    colestipol (COLESTID) 1 gram tablet, Take 1 Tab by mouth two (2) times a day., Disp: 180 Tab, Rfl: 1    propranolol LA (INDERAL LA) 80 mg SR capsule, Take 1 Cap by mouth daily. , Disp: 90 Cap, Rfl: 1    LORazepam (ATIVAN) 1 mg tablet, 1-2 daily prn, Disp: 45 Tab, Rfl: 5    nortriptyline (PAMELOR) 25 mg capsule, 1 po QPM  Indications: NEUROPATHIC PAIN, Disp: 90 Cap, Rfl: 1    nystatin-triamcinolone (MYCOLOG II) topical cream, Apply  to affected area two (2) times a day., Disp: 30 g, Rfl: 3    butalbital-acetaminophen-caffeine (ESGIC PLUS) -40 mg per tablet, Take 1 Tab by mouth two (2) times a day. Indications: MIGRAINE, TENSION-TYPE HEADACHE, Disp: , Rfl:     tiZANidine (ZANAFLEX) 2 mg tablet, Take 2 mg by mouth nightly as needed. Takes 1-2 tablets as needed at bedtime, Disp: , Rfl:     SUMAtriptan Succinate (SUMAVEL DOSEPRO) 6 mg/0.5 mL nfIj, Inject SC at onset of migraine. Repeat dose after 2 hrs if needed. Max 2 injections/24 hrs. Indications: MIGRAINE, Disp: 18 Syringe, Rfl: 1    eletriptan (RELPAX) 40 mg tablet, 1 po at onset of migraine and repeat after 2 hrs. If needed.   Max dose 2 tabs/24 hrs., Disp: 18 Tab, Rfl: 1    HYDROcodone-acetaminophen (NORCO) 5-325 mg per tablet, Take 1 Tab by mouth every four (4) hours as needed for Pain., Disp: , Rfl:     Diclofenac Potassium (CAMBIA) 50 mg pwpk, Mix 1 packet in 1-2 oz of water and drink at onset of migraine (max: 1 packet/day)  Indications: MIGRAINE, Disp: 9 Packet, Rfl: 11    triamcinolone (NASACORT AQ) 55 mcg nasal inhaler, , Disp: , Rfl: 1    promethazine (PHENERGAN) 25 mg tablet, Take 1 Tab by mouth every eight (8) hours as needed for Nausea., Disp: 30 Tab, Rfl: 2    multivitamin (ONE A DAY) tablet, Take 1 Tab by mouth daily. Last dose 4 days ago, Disp: , Rfl:     aspirin 81 mg tablet, Take 81 mg by mouth daily. Last dose 5 days ago, Disp: , Rfl:     vitamin e (E GEMS) 1,000 unit capsule, Take 1,000 Units by mouth daily. Last dose 4 days ago, Disp: , Rfl:     calcium citrate-vitamin d3 (CITRACAL + D) 315-200 mg-unit Tab, Take 1 Tab by mouth daily (with breakfast). , Disp: , Rfl:    Date Last Reviewed:  8/16/2017   EXAMINATION:   Observation/Orthostatic Postural Assessment:          Rounded shoulders, forward head (not significant)  Palpation:          Tenderness over the T1 region and tension in shoulders   ROM:     Cervical       Flexion: decreased ROM with pain       Extension: WFL      R rotation: slightly limited with pain at end range      L rotation: slightly limited with pain at end range       R SB: limited and slightly painful       L SB: limited and slightly painful    Shoulder   Strength:     Cervical       Flexion: 4/5      Extension: 4-/5      R SB: 4/5      L SB: 4/5      R rotation:4/5      L rotation: 4/5   Shoulder    Special Tests:          Compression (-), Spurlings (-), Distraction (+)  Neurological Screen:        Sensation: no compliant of numbness or tingling   Functional Mobility:         Independent unless with migraine   Balance:          Good    Body Structures Involved:  1.  Muscles Body Functions Affected:  1. Mental  2. Sensory/Pain  3. Neuromusculoskeletal  4. Movement Related Activities and Participation Affected:  1. General Tasks and Demands  2. Mobility  3. Community, Social and Civic Life   CLINICAL PRESENTATION:   CLINICAL DECISION MAKING:   Outcome Measure: Tool Used: Neck Disability Index (NDI)  Score:  Initial: 29/50  Most Recent: X/50 (Date: -- )   Interpretation of Score: The Neck Disability Index is a revised form of the Oswestry Low Back Pain Index and is designed to measure the activities of daily living in person's with neck pain. Each section is scored on a 0-5 scale, 5 representing the greatest disability. The scores of each section are added together for a total score of 50. Score 0 1-10 11-20 21-30 31-40 41-49 50   Modifier CH CI CJ CK CL CM CN     Medical Necessity:   · Patient is expected to demonstrate progress in strength and range of motion to increase independence with functional activities daily with less pain. .  Reason for Services/Other Comments:  · Patient continues to require present interventions due to patient's inability to perform functional activities without neck pain. Indu Peaks TREATMENT:   (In addition to Assessment/Re-Assessment sessions the following treatments were rendered)  THERAPEUTIC EXERCISE: (see below for minutes):  Exercises per grid below to improve mobility and strength. Required minimal verbal cues to promote proper body alignment. Progressed resistance, range and repetitions as indicated. MANUAL THERAPY: (see below for minutes): Joint mobilization, Soft tissue mobilization and Manipulation was utilized and necessary because of the patient's restricted joint motion, painful spasm and restricted motion of soft tissue.    MODALITIES: (see below for minutes):      see chart below    MECHANICAL TRACTION: (see below for minutes): Traction was used due to the patient's neck pain and migraine headaches in order to relieve pain in or originating from his spine.    Date: 7-25-17 08/02/17 (visit 2) 8-9-17  (Visit 3)  8-16-17  (visit 4)     Modalities:  30  min 30 mins  15 mins     MHP with IFC  15 min at beginning of treatment 15 mins (8:45-9) 15 mins     Traction  15 min up to 14# pull for muscular stretching at end of session 15 mins to 16# on 2nd level (9:15-9:30)             Therapeutic Exercise: 15 mins  25 min      Upper trap stretch  x15SH  [id number] B manuall      Levator stretch  x15SH  [id number] B manually      Rotation stretch  5SH bilateral  [id number] B manually      Chin tuck  [id number]      Alternating isometrics  Into cervical rotation [id number]              Proprioceptive Activities:                                Manual Therapy:  5 min 15 mins  30 mins     Soft tissue mobilization  To cervical paraspinals x 5 min 15 mins to extensors and upper traps  30 mins to extensors and upper traps as well as passive stretching             Functional Activities:                                        HEP: General stretching and ROM in the neck was recommended daily. Pt was educated on TENS unit and was told to purchase more supportive pillow and to use a towel roll under neck until. Treatment/Session Assessment:  No traction was done today due to patient coming in with a headache and will continue next visit. Pt felt better after therapy. Pt is returning to the doctor today and will make more appts. · Pain/ Symptoms: Initial:   0/10 \"this last time I got a migraine it went away faster but then it came back.'  Post Session:  0/10  ·   Compliance with Program/Exercises: Will assess as treatment progresses. · Recommendations/Intent for next treatment session: \"Next visit will focus on advancements to more challenging activities\".   Total Treatment Duration:  PT Patient Time In/Time Out  Time In: 0845  Time Out: [address], PT, DPT

## 2017-08-16 NOTE — PROGRESS NOTES
Claire Lyons  : [id number] [facility] [address]  [address] [place] [address].  [phone]   [phone]        OUTPATIENT PHYSICAL THERAPY:Daily Note 2017      ICD-10: Treatment Diagnosis: Cervicalgia (M54.2) , Headache (R51)  Precautions/Allergies:   Pcn [penicillins] and Shellfish derived   Fall Risk Score: 0 (? 5 = High Risk)  MD Orders: Eval and Treat  MEDICAL/REFERRING DIAGNOSIS:  Migraines [G43.909]  Neck pain [M54.2]   DATE OF ONSET: 2 years ago   REFERRING PHYSICIAN: Yani Dash*  RETURN PHYSICIAN APPOINTMENT: freddie      INITIAL ASSESSMENT:  Ms. Susan Miranda presents to therapy with pain in the neck and shoulders as well as recurring chronic migraines that are debilitating. Pt is having trouble at times with daily activities due to the neck pain that causes the migraines to come on. Pt would benefit from skilled physical therapy for the above diagnosis to help with decreased tension, and to decrease the amount of episodes of migraines. PROBLEM LIST (Impacting functional limitations):  1. Decreased Strength  2. Decreased ADL/Functional Activities  3. Increased Pain  4. Decreased Activity Tolerance  5. Decreased Flexibility/Joint Mobility INTERVENTIONS PLANNED:  1. Cold  2. Electrical Stimulation  3. Heat  4. Home Exercise Program (HEP)  5. Manual Therapy  6. Range of Motion (ROM)  7. Therapeutic Exercise/Strengthening  8. Ultrasound (US)   TREATMENT PLAN:  Effective Dates: 17 TO 9-15-17. Frequency/Duration: 2 times a week for 7 weeks  GOALS: (Goals have been discussed and agreed upon with patient.)  Short-Term Functional Goals: Time Frame: 2 weeks   1. Ms. Susan Miranda to be independent with HEP. 2. Pt able to tolerate maintaining proper posture in sitting without VCs and no increase in pain in the neck or shoulders for >30 mins. 3. Pt to improve cervical ROM without pain to improve driving safety.    Discharge Goals: Time Frame: 7 weeks   1. Pt able to tolerate daily functional activities without pain greater than 3/10 in the neck or shoulders. 2. Pt able to sleep throughout night without waking from pain in the head or neck. 3. Pt to report less debilitating migraines to less than 2 days. Rehabilitation Potential For Stated Goals: Fair                HISTORY:   History of Present Injury/Illness (Reason for Referral):  Pt 51 y/o F with pain in the neck and head with migraines that were debilitating causing her to go out of work on disability 2 years ago. She has been getting injections in her neck and has had therapy before 2 years ago. She is still seeing pain management for the injections. She is getting migraines about 1-2 times a week and is lasting 3-4 days at a time. She is currently out of work   Past Medical History/Comorbidities:   Ms. Asim Lozano  has a past medical history of Anxiety attack; Borderline personality disorder (10/8/2014); Chicken pox; Cystocele; Esophageal reflux (10/8/2014); Hypercholesteremia; Measles; Migraine with aura, without mention of intractable migraine without mention of status migrainosus (10/8/2014); Migraines; Morbid obesity ([facility] [address] Mumps; Ovarian cyst; Pneumonia; Rectocele; Stress incontinence (2007); Symptomatic menopausal or female climacteric states (10/8/2014); Unspecified essential hypertension (10/8/2014); Unspecified hypothyroidism; and Urge incontinence (2007). Ms. Asim Lozano  has a past surgical history that includes urological (2007); gastrectomy (02/06/12); hysterectomy (1998); salpingo-oophorectomy (2003); oophorectomy (2003); hysterectomy (2000); orthopaedic; and orthopaedic (04/2015).   Social History/Living Environment:    Lives alone   Prior Level of Function/Work/Activity:  Working toward long term disability   Dominant Side:         RIGHT  Other Clinical Tests:          xrays and MRIs have been done previously   Previous Treatment Approaches:          Therapy 2 years ago for neck pain   Personal Factors:          Past/Current Experience:  Migraines for the past 2 years   Current Medications:    Current Outpatient Prescriptions:     verapamil ER (VERELAN PM) 100 mg capsule, Take 1 Cap by mouth nightly., Disp: 90 Cap, Rfl: 1    estradiol acetate (FEMRING) 0.05 mg/24 hr ring, Insert 1 Units into vagina every three (3) months., Disp: 1 Each, Rfl: 4    ezetimibe-simvastatin (VYTORIN) 10-20 mg per tablet, Take 1 Tab by mouth nightly., Disp: 90 Tab, Rfl: 1    SYNTHROID 125 mcg tablet, Take 1 Tab by mouth Daily (before breakfast). GEMINI, Disp: 90 Tab, Rfl: 1    potassium chloride (KLOR-CON M20) 20 mEq tablet, Take 1 Tab by mouth two (2) times a day., Disp: 180 Tab, Rfl: 1    indapamide (LOZOL) 2.5 mg tablet, Take 1 Tab by mouth every morning., Disp: 90 Tab, Rfl: 1    colestipol (COLESTID) 1 gram tablet, Take 1 Tab by mouth two (2) times a day., Disp: 180 Tab, Rfl: 1    propranolol LA (INDERAL LA) 80 mg SR capsule, Take 1 Cap by mouth daily. , Disp: 90 Cap, Rfl: 1    LORazepam (ATIVAN) 1 mg tablet, 1-2 daily prn, Disp: 45 Tab, Rfl: 5    nortriptyline (PAMELOR) 25 mg capsule, 1 po QPM  Indications: NEUROPATHIC PAIN, Disp: 90 Cap, Rfl: 1    nystatin-triamcinolone (MYCOLOG II) topical cream, Apply  to affected area two (2) times a day., Disp: 30 g, Rfl: 3    butalbital-acetaminophen-caffeine (ESGIC PLUS) -40 mg per tablet, Take 1 Tab by mouth two (2) times a day. Indications: MIGRAINE, TENSION-TYPE HEADACHE, Disp: , Rfl:     tiZANidine (ZANAFLEX) 2 mg tablet, Take 2 mg by mouth nightly as needed. Takes 1-2 tablets as needed at bedtime, Disp: , Rfl:     SUMAtriptan Succinate (SUMAVEL DOSEPRO) 6 mg/0.5 mL nfIj, Inject SC at onset of migraine. Repeat dose after 2 hrs if needed. Max 2 injections/24 hrs. Indications: MIGRAINE, Disp: 18 Syringe, Rfl: 1    eletriptan (RELPAX) 40 mg tablet, 1 po at onset of migraine and repeat after 2 hrs. If needed.   Max dose 2 tabs/24 hrs., Disp: 18 Tab, Rfl: 1    HYDROcodone-acetaminophen (NORCO) 5-325 mg per tablet, Take 1 Tab by mouth every four (4) hours as needed for Pain., Disp: , Rfl:     Diclofenac Potassium (CAMBIA) 50 mg pwpk, Mix 1 packet in 1-2 oz of water and drink at onset of migraine (max: 1 packet/day)  Indications: MIGRAINE, Disp: 9 Packet, Rfl: 11    triamcinolone (NASACORT AQ) 55 mcg nasal inhaler, , Disp: , Rfl: 1    promethazine (PHENERGAN) 25 mg tablet, Take 1 Tab by mouth every eight (8) hours as needed for Nausea., Disp: 30 Tab, Rfl: 2    multivitamin (ONE A DAY) tablet, Take 1 Tab by mouth daily. Last dose 4 days ago, Disp: , Rfl:     aspirin 81 mg tablet, Take 81 mg by mouth daily. Last dose 5 days ago, Disp: , Rfl:     vitamin e (E GEMS) 1,000 unit capsule, Take 1,000 Units by mouth daily. Last dose 4 days ago, Disp: , Rfl:     calcium citrate-vitamin d3 (CITRACAL + D) 315-200 mg-unit Tab, Take 1 Tab by mouth daily (with breakfast). , Disp: , Rfl:    Date Last Reviewed:  8/16/2017   EXAMINATION:   Observation/Orthostatic Postural Assessment:          Rounded shoulders, forward head (not significant)  Palpation:          Tenderness over the T1 region and tension in shoulders   ROM:     Cervical       Flexion: decreased ROM with pain       Extension: WFL      R rotation: slightly limited with pain at end range      L rotation: slightly limited with pain at end range       R SB: limited and slightly painful       L SB: limited and slightly painful    Shoulder   Strength:     Cervical       Flexion: 4/5      Extension: 4-/5      R SB: 4/5      L SB: 4/5      R rotation:4/5      L rotation: 4/5   Shoulder    Special Tests:          Compression (-), Spurlings (-), Distraction (+)  Neurological Screen:        Sensation: no compliant of numbness or tingling   Functional Mobility:         Independent unless with migraine   Balance:          Good    Body Structures Involved:  1.  Muscles Body Functions Affected:  1. Mental  2. Sensory/Pain  3. Neuromusculoskeletal  4. Movement Related Activities and Participation Affected:  1. General Tasks and Demands  2. Mobility  3. Community, Social and Civic Life   CLINICAL PRESENTATION:   CLINICAL DECISION MAKING:   Outcome Measure: Tool Used: Neck Disability Index (NDI)  Score:  Initial: 29/50  Most Recent: X/50 (Date: -- )   Interpretation of Score: The Neck Disability Index is a revised form of the Oswestry Low Back Pain Index and is designed to measure the activities of daily living in person's with neck pain. Each section is scored on a 0-5 scale, 5 representing the greatest disability. The scores of each section are added together for a total score of 50. Score 0 1-10 11-20 21-30 31-40 41-49 50   Modifier CH CI CJ CK CL CM CN     Medical Necessity:   · Patient is expected to demonstrate progress in strength and range of motion to increase independence with functional activities daily with less pain. .  Reason for Services/Other Comments:  · Patient continues to require present interventions due to patient's inability to perform functional activities without neck pain. Jesús Butler TREATMENT:   (In addition to Assessment/Re-Assessment sessions the following treatments were rendered)  THERAPEUTIC EXERCISE: (see below for minutes):  Exercises per grid below to improve mobility and strength. Required minimal verbal cues to promote proper body alignment. Progressed resistance, range and repetitions as indicated. MANUAL THERAPY: (see below for minutes): Joint mobilization, Soft tissue mobilization and Manipulation was utilized and necessary because of the patient's restricted joint motion, painful spasm and restricted motion of soft tissue.    MODALITIES: (see below for minutes):      see chart below    MECHANICAL TRACTION: (see below for minutes): Traction was used due to the patient's neck pain and migraine headaches in order to relieve pain in or originating from his spine.    Date: 7-25-17 08/02/17 (visit 2) 8-9-17  (Visit 3)  8-16-17  (visit 4)     Modalities:  30  min 30 mins  15 mins     MHP with IFC  15 min at beginning of treatment 15 mins (8:45-9) 15 mins     Traction  15 min up to 14# pull for muscular stretching at end of session 15 mins to 16# on 2nd level (9:15-9:30)             Therapeutic Exercise: 15 mins  25 min      Upper trap stretch  x15SH  [id number] B manuall      Levator stretch  x15SH  [id number] B manually      Rotation stretch  5SH bilateral  [id number] B manually      Chin tuck  [id number]      Alternating isometrics  Into cervical rotation [id number]              Proprioceptive Activities:                                Manual Therapy:  5 min 15 mins      Soft tissue mobilization  To cervical paraspinals x 5 min 15 mins to extensors and upper traps              Functional Activities:                                        HEP: General stretching and ROM in the neck was recommended daily. Pt was educated on TENS unit and was told to purchase more supportive pillow and to use a towel roll under neck until. Treatment/Session Assessment:  Pt tolerated all stretches and modalities without pain. Pt was told to continue with use of TENS unit at home as needed and to continue with the stretches given. · Pain/ Symptoms: Initial:   0/10 \"this last time I got a migraine it went away faster but then it came back.'  Post Session:  0/10  ·   Compliance with Program/Exercises: Will assess as treatment progresses. · Recommendations/Intent for next treatment session: \"Next visit will focus on advancements to more challenging activities\".   Total Treatment Duration:  PT Patient Time In/Time Out  Time In: 0845  Time Out: [address], PT, DPT

## 2017-08-18 ENCOUNTER — HOSPITAL ENCOUNTER (OUTPATIENT)
Dept: PHYSICAL THERAPY | Age: 52
Discharge: HOME | End: 2017-08-18
Payer: COMMERCIAL

## 2017-08-18 PROCEDURE — 97014 ELECTRIC STIMULATION THERAPY: CPT

## 2017-08-18 PROCEDURE — 97110 THERAPEUTIC EXERCISES: CPT

## 2017-08-18 PROCEDURE — 97140 MANUAL THERAPY 1/> REGIONS: CPT

## 2017-08-18 NOTE — PROGRESS NOTES
Claire Lyons  : [id number] [facility] [address]  [address] [place] [address].  [phone]   [phone]        OUTPATIENT PHYSICAL THERAPY:Daily Note 2017      ICD-10: Treatment Diagnosis: Cervicalgia (M54.2) , Headache (R51)  Precautions/Allergies:   Pcn [penicillins] and Shellfish derived   Fall Risk Score: 0 (? 5 = High Risk)  MD Orders: Eval and Treat  MEDICAL/REFERRING DIAGNOSIS:  Migraines [G43.909]  Neck pain [M54.2]   DATE OF ONSET: 2 years ago   REFERRING PHYSICIAN: Yani Dash*  RETURN PHYSICIAN APPOINTMENT: freddie      INITIAL ASSESSMENT:  Ms. Vj Shi presents to therapy with pain in the neck and shoulders as well as recurring chronic migraines that are debilitating. Pt is having trouble at times with daily activities due to the neck pain that causes the migraines to come on. Pt would benefit from skilled physical therapy for the above diagnosis to help with decreased tension, and to decrease the amount of episodes of migraines. PROBLEM LIST (Impacting functional limitations):  1. Decreased Strength  2. Decreased ADL/Functional Activities  3. Increased Pain  4. Decreased Activity Tolerance  5. Decreased Flexibility/Joint Mobility INTERVENTIONS PLANNED:  1. Cold  2. Electrical Stimulation  3. Heat  4. Home Exercise Program (HEP)  5. Manual Therapy  6. Range of Motion (ROM)  7. Therapeutic Exercise/Strengthening  8. Ultrasound (US)   TREATMENT PLAN:  Effective Dates: 17 TO 9-15-17. Frequency/Duration: 2 times a week for 7 weeks  GOALS: (Goals have been discussed and agreed upon with patient.)  Short-Term Functional Goals: Time Frame: 2 weeks   1. Ms. Vj Shi to be independent with HEP. 2. Pt able to tolerate maintaining proper posture in sitting without VCs and no increase in pain in the neck or shoulders for >30 mins. 3. Pt to improve cervical ROM without pain to improve driving safety.    Discharge Goals: Time Frame: 7 weeks   1. Pt able to tolerate daily functional activities without pain greater than 3/10 in the neck or shoulders. 2. Pt able to sleep throughout night without waking from pain in the head or neck. 3. Pt to report less debilitating migraines to less than 2 days. Rehabilitation Potential For Stated Goals: Fair                HISTORY:   History of Present Injury/Illness (Reason for Referral):  Pt 49 y/o F with pain in the neck and head with migraines that were debilitating causing her to go out of work on disability 2 years ago. She has been getting injections in her neck and has had therapy before 2 years ago. She is still seeing pain management for the injections. She is getting migraines about 1-2 times a week and is lasting 3-4 days at a time. She is currently out of work   Past Medical History/Comorbidities:   Ms. Marium Bowen  has a past medical history of Anxiety attack; Borderline personality disorder (10/8/2014); Chicken pox; Cystocele; Esophageal reflux (10/8/2014); Hypercholesteremia; Measles; Migraine with aura, without mention of intractable migraine without mention of status migrainosus (10/8/2014); Migraines; Morbid obesity ([facility] [address] Mumps; Ovarian cyst; Pneumonia; Rectocele; Stress incontinence (2007); Symptomatic menopausal or female climacteric states (10/8/2014); Unspecified essential hypertension (10/8/2014); Unspecified hypothyroidism; and Urge incontinence (2007). Ms. Marium Bowen  has a past surgical history that includes urological (2007); gastrectomy (02/06/12); hysterectomy (1998); salpingo-oophorectomy (2003); oophorectomy (2003); hysterectomy (2000); orthopaedic; and orthopaedic (04/2015).   Social History/Living Environment:    Lives alone   Prior Level of Function/Work/Activity:  Working toward long term disability   Dominant Side:         RIGHT  Other Clinical Tests:          xrays and MRIs have been done previously   Previous Treatment Approaches:          Therapy 2 years ago for neck pain   Personal Factors:          Past/Current Experience:  Migraines for the past 2 years   Current Medications:    Current Outpatient Prescriptions:     verapamil ER (VERELAN PM) 100 mg capsule, Take 1 Cap by mouth nightly., Disp: 90 Cap, Rfl: 1    ezetimibe-simvastatin (VYTORIN) 10-20 mg per tablet, Take 1 Tab by mouth nightly., Disp: 90 Tab, Rfl: 1    SYNTHROID 125 mcg tablet, Take 1 Tab by mouth Daily (before breakfast). GEMINI, Disp: 90 Tab, Rfl: 1    potassium chloride (KLOR-CON M20) 20 mEq tablet, Take 1 Tab by mouth two (2) times a day., Disp: 180 Tab, Rfl: 1    indapamide (LOZOL) 2.5 mg tablet, Take 1 Tab by mouth every morning., Disp: 90 Tab, Rfl: 1    colestipol (COLESTID) 1 gram tablet, Take 1 Tab by mouth two (2) times a day., Disp: 180 Tab, Rfl: 1    propranolol LA (INDERAL LA) 80 mg SR capsule, Take 1 Cap by mouth daily. , Disp: 90 Cap, Rfl: 1    LORazepam (ATIVAN) 1 mg tablet, 1-2 daily prn, Disp: 90 Tab, Rfl: 1    nortriptyline (PAMELOR) 25 mg capsule, 1 po QPM  Indications: NEUROPATHIC PAIN, Disp: 90 Cap, Rfl: 1    tiZANidine (ZANAFLEX) 2 mg tablet, Take 2 Tabs by mouth nightly as needed. Takes 1-2 tablets as needed at bedtime, Disp: 90 Tab, Rfl: 1    estradiol acetate (FEMRING) 0.05 mg/24 hr ring, Insert 1 Units into vagina every three (3) months., Disp: 1 Each, Rfl: 4    nystatin-triamcinolone (MYCOLOG II) topical cream, Apply  to affected area two (2) times a day., Disp: 30 g, Rfl: 3    butalbital-acetaminophen-caffeine (ESGIC PLUS) -40 mg per tablet, Take 1 Tab by mouth two (2) times a day. Indications: MIGRAINE, TENSION-TYPE HEADACHE, Disp: , Rfl:     SUMAtriptan Succinate (SUMAVEL DOSEPRO) 6 mg/0.5 mL nfIj, Inject SC at onset of migraine. Repeat dose after 2 hrs if needed. Max 2 injections/24 hrs. Indications: MIGRAINE, Disp: 18 Syringe, Rfl: 1    eletriptan (RELPAX) 40 mg tablet, 1 po at onset of migraine and repeat after 2 hrs. If needed.   Max dose 2 tabs/24 hrs., Disp: 18 Tab, Rfl: 1    HYDROcodone-acetaminophen (NORCO) 5-325 mg per tablet, Take 1 Tab by mouth every four (4) hours as needed for Pain., Disp: , Rfl:     Diclofenac Potassium (CAMBIA) 50 mg pwpk, Mix 1 packet in 1-2 oz of water and drink at onset of migraine (max: 1 packet/day)  Indications: MIGRAINE, Disp: 9 Packet, Rfl: 11    triamcinolone (NASACORT AQ) 55 mcg nasal inhaler, , Disp: , Rfl: 1    promethazine (PHENERGAN) 25 mg tablet, Take 1 Tab by mouth every eight (8) hours as needed for Nausea., Disp: 30 Tab, Rfl: 2    multivitamin (ONE A DAY) tablet, Take 1 Tab by mouth daily. Last dose 4 days ago, Disp: , Rfl:     aspirin 81 mg tablet, Take 81 mg by mouth daily. Last dose 5 days ago, Disp: , Rfl:     vitamin e (E GEMS) 1,000 unit capsule, Take 1,000 Units by mouth daily. Last dose 4 days ago, Disp: , Rfl:     calcium citrate-vitamin d3 (CITRACAL + D) 315-200 mg-unit Tab, Take 1 Tab by mouth daily (with breakfast). , Disp: , Rfl:    Date Last Reviewed:  8/18/2017   EXAMINATION:   Observation/Orthostatic Postural Assessment:          Rounded shoulders, forward head (not significant)  Palpation:          Tenderness over the T1 region and tension in shoulders   ROM:     Cervical       Flexion: decreased ROM with pain       Extension: WFL      R rotation: slightly limited with pain at end range      L rotation: slightly limited with pain at end range       R SB: limited and slightly painful       L SB: limited and slightly painful    Shoulder   Strength:     Cervical       Flexion: 4/5      Extension: 4-/5      R SB: 4/5      L SB: 4/5      R rotation:4/5      L rotation: 4/5   Shoulder    Special Tests:          Compression (-), Spurlings (-), Distraction (+)  Neurological Screen:        Sensation: no compliant of numbness or tingling   Functional Mobility:         Independent unless with migraine   Balance:          Good    Body Structures Involved:  1.  Muscles Body Functions Affected:  1. Mental  2. Sensory/Pain  3. Neuromusculoskeletal  4. Movement Related Activities and Participation Affected:  1. General Tasks and Demands  2. Mobility  3. Community, Social and Civic Life   CLINICAL PRESENTATION:   CLINICAL DECISION MAKING:   Outcome Measure: Tool Used: Neck Disability Index (NDI)  Score:  Initial: 29/50  Most Recent: X/50 (Date: -- )   Interpretation of Score: The Neck Disability Index is a revised form of the Oswestry Low Back Pain Index and is designed to measure the activities of daily living in person's with neck pain. Each section is scored on a 0-5 scale, 5 representing the greatest disability. The scores of each section are added together for a total score of 50. Score 0 1-10 11-20 21-30 31-40 41-49 50   Modifier CH CI CJ CK CL CM CN     Medical Necessity:   · Patient is expected to demonstrate progress in strength and range of motion to increase independence with functional activities daily with less pain. .  Reason for Services/Other Comments:  · Patient continues to require present interventions due to patient's inability to perform functional activities without neck pain. Remberto Mendoza TREATMENT:   (In addition to Assessment/Re-Assessment sessions the following treatments were rendered)  THERAPEUTIC EXERCISE: (see below for minutes):  Exercises per grid below to improve mobility and strength. Required minimal verbal cues to promote proper body alignment. Progressed resistance, range and repetitions as indicated. MANUAL THERAPY: (see below for minutes): Joint mobilization, Soft tissue mobilization and Manipulation was utilized and necessary because of the patient's restricted joint motion, painful spasm and restricted motion of soft tissue.    MODALITIES: (see below for minutes):      see chart below    MECHANICAL TRACTION: (see below for minutes): Traction was used due to the patient's neck pain and migraine headaches in order to relieve pain in or originating from his spine.    Date: 7-25-17 08/02/17 (visit 2) 8-9-17  (Visit 3)  8-16-17  (visit 4)  8-18-17  (Visit 5)    Modalities:  30  min 30 mins  15 mins  15 mins    MHP with IFC  15 min at beginning of treatment 15 mins (8:45-9) 15 mins  15 mins    Traction  15 min up to 14# pull for muscular stretching at end of session 15 mins to 16# on 2nd level (9:15-9:30)  (Discontinued)            Therapeutic Exercise: 15 mins  25 min   15 mins    Upper trap stretch  x15SH  [id number] B manuall   Repeat    Levator stretch  x15SH  [id number] B manually      Rotation stretch  5SH bilateral  [id number] B manually      Chin tuck  [id number]   Standing x20   Supine x 20    Alternating isometrics  Into cervical rotation [id number]      Doorway stretch      4x10SH   Proprioceptive Activities:                                Manual Therapy:  5 min 15 mins  30 mins  15 mins    Soft tissue mobilization  To cervical paraspinals x 5 min 15 mins to extensors and upper traps  30 mins to extensors and upper traps as well as passive stretching  15 mins supine with passive stretching           Functional Activities:                                        HEP: General stretching and ROM in the neck was recommended daily. Pt was educated on TENS unit and was told to purchase more supportive pillow and to use a towel roll under neck until. Treatment/Session Assessment: Discontinued traction per patient request due to no migraine in the past few days since discontinuing last visit. Continue progressing with exercises. · Pain/ Symptoms: Initial:   2/10 \"I still have a little bit of a headache but its not a migraine. \"  Post Session:  0/10  ·   Compliance with Program/Exercises: Will assess as treatment progresses. · Recommendations/Intent for next treatment session: \"Next visit will focus on advancements to more challenging activities\".   Total Treatment Duration:  PT Patient Time In/Time Out  Time In: 0845  Time Out: [address], PT, DPT

## 2017-08-23 ENCOUNTER — HOSPITAL ENCOUNTER (OUTPATIENT)
Dept: PHYSICAL THERAPY | Age: 52
Discharge: HOME | End: 2017-08-23
Payer: COMMERCIAL

## 2017-08-23 PROCEDURE — 97014 ELECTRIC STIMULATION THERAPY: CPT

## 2017-08-23 PROCEDURE — 97140 MANUAL THERAPY 1/> REGIONS: CPT

## 2017-08-23 NOTE — PROGRESS NOTES
Claire Lyons  : [id number] [facility] [address]  [address] [place] [address]  [phone]   [phone]        OUTPATIENT PHYSICAL THERAPY:Daily Note 2017      ICD-10: Treatment Diagnosis: Cervicalgia (M54.2) , Headache (R51)  Precautions/Allergies:   Pcn [penicillins] and Shellfish derived   Fall Risk Score: 0 (? 5 = High Risk)  MD Orders: Eval and Treat  MEDICAL/REFERRING DIAGNOSIS:  Migraines [G43.909]  Neck pain [M54.2]   DATE OF ONSET: 2 years ago   REFERRING PHYSICIAN: Yani Dash*  RETURN PHYSICIAN APPOINTMENT: freddie      INITIAL ASSESSMENT:  Ms. Christain Severe presents to therapy with pain in the neck and shoulders as well as recurring chronic migraines that are debilitating. Pt is having trouble at times with daily activities due to the neck pain that causes the migraines to come on. Pt would benefit from skilled physical therapy for the above diagnosis to help with decreased tension, and to decrease the amount of episodes of migraines. PROBLEM LIST (Impacting functional limitations):  1. Decreased Strength  2. Decreased ADL/Functional Activities  3. Increased Pain  4. Decreased Activity Tolerance  5. Decreased Flexibility/Joint Mobility INTERVENTIONS PLANNED:  1. Cold  2. Electrical Stimulation  3. Heat  4. Home Exercise Program (HEP)  5. Manual Therapy  6. Range of Motion (ROM)  7. Therapeutic Exercise/Strengthening  8. Ultrasound (US)   TREATMENT PLAN:  Effective Dates: 17 TO 9-15-17. Frequency/Duration: 2 times a week for 7 weeks  GOALS: (Goals have been discussed and agreed upon with patient.)  Short-Term Functional Goals: Time Frame: 2 weeks   1. Ms. Christain Severe to be independent with HEP. 2. Pt able to tolerate maintaining proper posture in sitting without VCs and no increase in pain in the neck or shoulders for >30 mins. 3. Pt to improve cervical ROM without pain to improve driving safety.    Discharge Goals: Time Frame: 7 weeks   1. Pt able to tolerate daily functional activities without pain greater than 3/10 in the neck or shoulders. 2. Pt able to sleep throughout night without waking from pain in the head or neck. 3. Pt to report less debilitating migraines to less than 2 days. Rehabilitation Potential For Stated Goals: Fair                HISTORY:   History of Present Injury/Illness (Reason for Referral):  Pt 49 y/o F with pain in the neck and head with migraines that were debilitating causing her to go out of work on disability 2 years ago. She has been getting injections in her neck and has had therapy before 2 years ago. She is still seeing pain management for the injections. She is getting migraines about 1-2 times a week and is lasting 3-4 days at a time. She is currently out of work   Past Medical History/Comorbidities:   Ms. Yuriy Linder  has a past medical history of Anxiety attack; Borderline personality disorder (10/8/2014); Chicken pox; Cystocele; Esophageal reflux (10/8/2014); Hypercholesteremia; Measles; Migraine with aura, without mention of intractable migraine without mention of status migrainosus (10/8/2014); Migraines; Morbid obesity ([facility] [address] Mumps; Ovarian cyst; Pneumonia; Rectocele; Stress incontinence (2007); Symptomatic menopausal or female climacteric states (10/8/2014); Unspecified essential hypertension (10/8/2014); Unspecified hypothyroidism; and Urge incontinence (2007). Ms. Yuriy Linder  has a past surgical history that includes urological (2007); gastrectomy (02/06/12); hysterectomy (1998); salpingo-oophorectomy (2003); oophorectomy (2003); hysterectomy (2000); orthopaedic; and orthopaedic (04/2015).   Social History/Living Environment:    Lives alone   Prior Level of Function/Work/Activity:  Working toward long term disability   Dominant Side:         RIGHT  Other Clinical Tests:          xrays and MRIs have been done previously   Previous Treatment Approaches:          Therapy 2 years ago for neck pain   Personal Factors:          Past/Current Experience:  Migraines for the past 2 years   Current Medications:    Current Outpatient Prescriptions:     verapamil ER (VERELAN PM) 100 mg capsule, Take 1 Cap by mouth nightly., Disp: 90 Cap, Rfl: 1    ezetimibe-simvastatin (VYTORIN) 10-20 mg per tablet, Take 1 Tab by mouth nightly., Disp: 90 Tab, Rfl: 1    SYNTHROID 125 mcg tablet, Take 1 Tab by mouth Daily (before breakfast). GEMINI, Disp: 90 Tab, Rfl: 1    potassium chloride (KLOR-CON M20) 20 mEq tablet, Take 1 Tab by mouth two (2) times a day., Disp: 180 Tab, Rfl: 1    indapamide (LOZOL) 2.5 mg tablet, Take 1 Tab by mouth every morning., Disp: 90 Tab, Rfl: 1    colestipol (COLESTID) 1 gram tablet, Take 1 Tab by mouth two (2) times a day., Disp: 180 Tab, Rfl: 1    propranolol LA (INDERAL LA) 80 mg SR capsule, Take 1 Cap by mouth daily. , Disp: 90 Cap, Rfl: 1    LORazepam (ATIVAN) 1 mg tablet, 1-2 daily prn, Disp: 90 Tab, Rfl: 1    nortriptyline (PAMELOR) 25 mg capsule, 1 po QPM  Indications: NEUROPATHIC PAIN, Disp: 90 Cap, Rfl: 1    tiZANidine (ZANAFLEX) 2 mg tablet, Take 2 Tabs by mouth nightly as needed. Takes 1-2 tablets as needed at bedtime, Disp: 90 Tab, Rfl: 1    estradiol acetate (FEMRING) 0.05 mg/24 hr ring, Insert 1 Units into vagina every three (3) months., Disp: 1 Each, Rfl: 4    nystatin-triamcinolone (MYCOLOG II) topical cream, Apply  to affected area two (2) times a day., Disp: 30 g, Rfl: 3    butalbital-acetaminophen-caffeine (ESGIC PLUS) -40 mg per tablet, Take 1 Tab by mouth two (2) times a day. Indications: MIGRAINE, TENSION-TYPE HEADACHE, Disp: , Rfl:     SUMAtriptan Succinate (SUMAVEL DOSEPRO) 6 mg/0.5 mL nfIj, Inject SC at onset of migraine. Repeat dose after 2 hrs if needed. Max 2 injections/24 hrs. Indications: MIGRAINE, Disp: 18 Syringe, Rfl: 1    eletriptan (RELPAX) 40 mg tablet, 1 po at onset of migraine and repeat after 2 hrs. If needed.   Max dose 2 tabs/24 hrs., Disp: 18 Tab, Rfl: 1    HYDROcodone-acetaminophen (NORCO) 5-325 mg per tablet, Take 1 Tab by mouth every four (4) hours as needed for Pain., Disp: , Rfl:     Diclofenac Potassium (CAMBIA) 50 mg pwpk, Mix 1 packet in 1-2 oz of water and drink at onset of migraine (max: 1 packet/day)  Indications: MIGRAINE, Disp: 9 Packet, Rfl: 11    triamcinolone (NASACORT AQ) 55 mcg nasal inhaler, , Disp: , Rfl: 1    promethazine (PHENERGAN) 25 mg tablet, Take 1 Tab by mouth every eight (8) hours as needed for Nausea., Disp: 30 Tab, Rfl: 2    multivitamin (ONE A DAY) tablet, Take 1 Tab by mouth daily. Last dose 4 days ago, Disp: , Rfl:     aspirin 81 mg tablet, Take 81 mg by mouth daily. Last dose 5 days ago, Disp: , Rfl:     vitamin e (E GEMS) 1,000 unit capsule, Take 1,000 Units by mouth daily. Last dose 4 days ago, Disp: , Rfl:     calcium citrate-vitamin d3 (CITRACAL + D) 315-200 mg-unit Tab, Take 1 Tab by mouth daily (with breakfast). , Disp: , Rfl:    Date Last Reviewed:  8/23/2017   EXAMINATION:   Observation/Orthostatic Postural Assessment:          Rounded shoulders, forward head (not significant)  Palpation:          Tenderness over the T1 region and tension in shoulders   ROM:     Cervical       Flexion: decreased ROM with pain       Extension: WFL      R rotation: slightly limited with pain at end range      L rotation: slightly limited with pain at end range       R SB: limited and slightly painful       L SB: limited and slightly painful    Shoulder   Strength:     Cervical       Flexion: 4/5      Extension: 4-/5      R SB: 4/5      L SB: 4/5      R rotation:4/5      L rotation: 4/5   Shoulder    Special Tests:          Compression (-), Spurlings (-), Distraction (+)  Neurological Screen:        Sensation: no compliant of numbness or tingling   Functional Mobility:         Independent unless with migraine   Balance:          Good    Body Structures Involved:  1.  Muscles Body Functions Affected:  1. Mental  2. Sensory/Pain  3. Neuromusculoskeletal  4. Movement Related Activities and Participation Affected:  1. General Tasks and Demands  2. Mobility  3. Community, Social and Civic Life   CLINICAL PRESENTATION:   CLINICAL DECISION MAKING:   Outcome Measure: Tool Used: Neck Disability Index (NDI)  Score:  Initial: 29/50  Most Recent: X/50 (Date: -- )   Interpretation of Score: The Neck Disability Index is a revised form of the Oswestry Low Back Pain Index and is designed to measure the activities of daily living in person's with neck pain. Each section is scored on a 0-5 scale, 5 representing the greatest disability. The scores of each section are added together for a total score of 50. Score 0 1-10 11-20 21-30 31-40 41-49 50   Modifier CH CI CJ CK CL CM CN     Medical Necessity:   · Patient is expected to demonstrate progress in strength and range of motion to increase independence with functional activities daily with less pain. .  Reason for Services/Other Comments:  · Patient continues to require present interventions due to patient's inability to perform functional activities without neck pain. Aleksandra Suárez TREATMENT:   (In addition to Assessment/Re-Assessment sessions the following treatments were rendered)  THERAPEUTIC EXERCISE: (see below for minutes):  Exercises per grid below to improve mobility and strength. Required minimal verbal cues to promote proper body alignment. Progressed resistance, range and repetitions as indicated. MANUAL THERAPY: (see below for minutes): Joint mobilization, Soft tissue mobilization and Manipulation was utilized and necessary because of the patient's restricted joint motion, painful spasm and restricted motion of soft tissue.    MODALITIES: (see below for minutes):      see chart below    MECHANICAL TRACTION: (see below for minutes): Traction was used due to the patient's neck pain and migraine headaches in order to relieve pain in or originating from his spine.    Date: 7-25-17 08/02/17 (visit 2) 8-9-17  (Visit 3)  8-16-17  (visit 4)  8-18-17  (Visit 5)  8-23-17  (Visit 6)   Modalities:  30  min 30 mins  15 mins  15 mins  15 mins    MHP with IFC  15 min at beginning of treatment 15 mins (8:45-9) 15 mins  15 mins  15 mins    Traction  15 min up to 14# pull for muscular stretching at end of session 15 mins to 16# on 2nd level (9:15-9:30)  (Discontinued)              Therapeutic Exercise: 15 mins  25 min   15 mins     Upper trap stretch  x15SH  [id number] B manuall   Repeat  Manual    Levator stretch  x15SH  [id number] B manually    Manual    Rotation stretch  5SH bilateral  [id number] B manually    Manual    Chin tuck  [id number]   Standing x20   Supine x 20     Alternating isometrics  Into cervical rotation [id number]       Doorway stretch      4x10SH    Proprioceptive Activities:                                    Manual Therapy:  5 min 15 mins  30 mins  15 mins  30 mins    Soft tissue mobilization  To cervical paraspinals x 5 min 15 mins to extensors and upper traps  30 mins to extensors and upper traps as well as passive stretching  15 mins supine with passive stretching 25 mins supine and seated with passive stretching with occipital release    Manual traction with towel       5 mins    Functional Activities:                                             HEP: General stretching and ROM in the neck was recommended daily. Pt was educated on TENS unit and was told to purchase more supportive pillow and to use a towel roll under neck until. Treatment/Session Assessment: Pt is feeling much better after discontinuing traction and only doing manual with the ES. Pt to continue with stretches at home and will be given more stretches next visit to begin over the weekend. Continue with POC. · Pain/ Symptoms: Initial:   0/10 \"Im feeling good today. I havent had a headache since last week. \"  Post Session:  0/10  ·   Compliance with Program/Exercises:  Will assess as treatment progresses. · Recommendations/Intent for next treatment session: \"Next visit will focus on advancements to more challenging activities\".   Total Treatment Duration:  PT Patient Time In/Time Out  Time In: 0930  Time Out: [address], PT, DPT

## 2017-08-25 ENCOUNTER — HOSPITAL ENCOUNTER (OUTPATIENT)
Dept: PHYSICAL THERAPY | Age: 52
Discharge: HOME | End: 2017-08-25
Payer: COMMERCIAL

## 2017-08-30 ENCOUNTER — HOSPITAL ENCOUNTER (OUTPATIENT)
Dept: PHYSICAL THERAPY | Age: 52
Discharge: HOME | End: 2017-08-30
Payer: COMMERCIAL

## 2017-09-01 ENCOUNTER — HOSPITAL ENCOUNTER (OUTPATIENT)
Dept: PHYSICAL THERAPY | Age: 52
Discharge: HOME | End: 2017-09-01
Payer: COMMERCIAL

## 2017-09-06 ENCOUNTER — HOSPITAL ENCOUNTER (OUTPATIENT)
Dept: PHYSICAL THERAPY | Age: 52
Discharge: HOME | End: 2017-09-06
Payer: COMMERCIAL

## 2017-09-08 ENCOUNTER — HOSPITAL ENCOUNTER (OUTPATIENT)
Dept: PHYSICAL THERAPY | Age: 52
Discharge: HOME | End: 2017-09-08
Payer: COMMERCIAL

## 2017-09-08 PROCEDURE — 97014 ELECTRIC STIMULATION THERAPY: CPT

## 2017-09-08 PROCEDURE — 97140 MANUAL THERAPY 1/> REGIONS: CPT

## 2017-09-08 NOTE — PROGRESS NOTES
Claire Lyons  : [id number] [facility] [address]  [address] [place] [address].  [phone]   [phone]        OUTPATIENT PHYSICAL THERAPY:Daily Note 2017      ICD-10: Treatment Diagnosis: Cervicalgia (M54.2) , Headache (R51)  Precautions/Allergies:   Pcn [penicillins] and Shellfish derived   Fall Risk Score: 0 (? 5 = High Risk)  MD Orders: Eval and Treat  MEDICAL/REFERRING DIAGNOSIS:  Migraines [G43.909]  Neck pain [M54.2]   DATE OF ONSET: 2 years ago   REFERRING PHYSICIAN: Yani Dash*  RETURN PHYSICIAN APPOINTMENT: [email or web address]      INITIAL ASSESSMENT:  Ms. Jackie Kelly presents to therapy with pain in the neck and shoulders as well as recurring chronic migraines that are debilitating. Pt is having trouble at times with daily activities due to the neck pain that causes the migraines to come on. Pt would benefit from skilled physical therapy for the above diagnosis to help with decreased tension, and to decrease the amount of episodes of migraines. PROBLEM LIST (Impacting functional limitations):  1. Decreased Strength  2. Decreased ADL/Functional Activities  3. Increased Pain  4. Decreased Activity Tolerance  5. Decreased Flexibility/Joint Mobility INTERVENTIONS PLANNED:  1. Cold  2. Electrical Stimulation  3. Heat  4. Home Exercise Program (HEP)  5. Manual Therapy  6. Range of Motion (ROM)  7. Therapeutic Exercise/Strengthening  8. Ultrasound (US)   TREATMENT PLAN:  Effective Dates: 17 TO 9-15-17. Frequency/Duration: 2 times a week for 7 weeks  GOALS: (Goals have been discussed and agreed upon with patient.)  Short-Term Functional Goals: Time Frame: 2 weeks   1. Ms. Jackie Kelly to be independent with HEP. 2. Pt able to tolerate maintaining proper posture in sitting without VCs and no increase in pain in the neck or shoulders for >30 mins. 3. Pt to improve cervical ROM without pain to improve driving safety.    Discharge Goals: Time Frame: 7 weeks   1. Pt able to tolerate daily functional activities without pain greater than 3/10 in the neck or shoulders. 2. Pt able to sleep throughout night without waking from pain in the head or neck. 3. Pt to report less debilitating migraines to less than 2 days. Rehabilitation Potential For Stated Goals: Fair                HISTORY:   History of Present Injury/Illness (Reason for Referral):  Pt 51 y/o F with pain in the neck and head with migraines that were debilitating causing her to go out of work on disability 2 years ago. She has been getting injections in her neck and has had therapy before 2 years ago. She is still seeing pain management for the injections. She is getting migraines about 1-2 times a week and is lasting 3-4 days at a time. She is currently out of work   Past Medical History/Comorbidities:   Ms. Alvino Hatfield  has a past medical history of Anxiety attack; Borderline personality disorder (10/8/2014); Chicken pox; Cystocele; Esophageal reflux (10/8/2014); Hypercholesteremia; Measles; Migraine with aura, without mention of intractable migraine without mention of status migrainosus (10/8/2014); Migraines; Morbid obesity ([facility] [address] Mumps; Ovarian cyst; Pneumonia; Rectocele; Stress incontinence (2007); Symptomatic menopausal or female climacteric states (10/8/2014); Unspecified essential hypertension (10/8/2014); Unspecified hypothyroidism; and Urge incontinence (2007). Ms. Alvino Hatfield  has a past surgical history that includes urological (2007); gastrectomy (02/06/12); hysterectomy (1998); salpingo-oophorectomy (2003); oophorectomy (2003); hysterectomy (2000); orthopaedic; and orthopaedic (04/2015).   Social History/Living Environment:    Lives alone   Prior Level of Function/Work/Activity:  Working toward long term disability   Dominant Side:         RIGHT  Other Clinical Tests:          xrays and MRIs have been done previously   Previous Treatment Approaches:          Therapy 2 years ago for neck pain   Personal Factors:          Past/Current Experience:  Migraines for the past 2 years   Current Medications:    Current Outpatient Prescriptions:     verapamil ER (VERELAN PM) 100 mg capsule, Take 1 Cap by mouth nightly., Disp: 90 Cap, Rfl: 1    ezetimibe-simvastatin (VYTORIN) 10-20 mg per tablet, Take 1 Tab by mouth nightly., Disp: 90 Tab, Rfl: 1    SYNTHROID 125 mcg tablet, Take 1 Tab by mouth Daily (before breakfast). GEMINI, Disp: 90 Tab, Rfl: 1    potassium chloride (KLOR-CON M20) 20 mEq tablet, Take 1 Tab by mouth two (2) times a day., Disp: 180 Tab, Rfl: 1    indapamide (LOZOL) 2.5 mg tablet, Take 1 Tab by mouth every morning., Disp: 90 Tab, Rfl: 1    colestipol (COLESTID) 1 gram tablet, Take 1 Tab by mouth two (2) times a day., Disp: 180 Tab, Rfl: 1    propranolol LA (INDERAL LA) 80 mg SR capsule, Take 1 Cap by mouth daily. , Disp: 90 Cap, Rfl: 1    LORazepam (ATIVAN) 1 mg tablet, 1-2 daily prn, Disp: 90 Tab, Rfl: 1    nortriptyline (PAMELOR) 25 mg capsule, 1 po QPM  Indications: NEUROPATHIC PAIN, Disp: 90 Cap, Rfl: 1    tiZANidine (ZANAFLEX) 2 mg tablet, Take 2 Tabs by mouth nightly as needed. Takes 1-2 tablets as needed at bedtime, Disp: 90 Tab, Rfl: 1    estradiol acetate (FEMRING) 0.05 mg/24 hr ring, Insert 1 Units into vagina every three (3) months., Disp: 1 Each, Rfl: 4    nystatin-triamcinolone (MYCOLOG II) topical cream, Apply  to affected area two (2) times a day., Disp: 30 g, Rfl: 3    butalbital-acetaminophen-caffeine (ESGIC PLUS) -40 mg per tablet, Take 1 Tab by mouth two (2) times a day. Indications: MIGRAINE, TENSION-TYPE HEADACHE, Disp: , Rfl:     SUMAtriptan Succinate (SUMAVEL DOSEPRO) 6 mg/0.5 mL nfIj, Inject SC at onset of migraine. Repeat dose after 2 hrs if needed. Max 2 injections/24 hrs. Indications: MIGRAINE, Disp: 18 Syringe, Rfl: 1    eletriptan (RELPAX) 40 mg tablet, 1 po at onset of migraine and repeat after 2 hrs. If needed.   Max dose 2 tabs/24 hrs., Disp: 18 Tab, Rfl: 1    HYDROcodone-acetaminophen (NORCO) 5-325 mg per tablet, Take 1 Tab by mouth every four (4) hours as needed for Pain., Disp: , Rfl:     Diclofenac Potassium (CAMBIA) 50 mg pwpk, Mix 1 packet in 1-2 oz of water and drink at onset of migraine (max: 1 packet/day)  Indications: MIGRAINE, Disp: 9 Packet, Rfl: 11    triamcinolone (NASACORT AQ) 55 mcg nasal inhaler, , Disp: , Rfl: 1    promethazine (PHENERGAN) 25 mg tablet, Take 1 Tab by mouth every eight (8) hours as needed for Nausea., Disp: 30 Tab, Rfl: 2    multivitamin (ONE A DAY) tablet, Take 1 Tab by mouth daily. Last dose 4 days ago, Disp: , Rfl:     aspirin 81 mg tablet, Take 81 mg by mouth daily. Last dose 5 days ago, Disp: , Rfl:     vitamin e (E GEMS) 1,000 unit capsule, Take 1,000 Units by mouth daily. Last dose 4 days ago, Disp: , Rfl:     calcium citrate-vitamin d3 (CITRACAL + D) 315-200 mg-unit Tab, Take 1 Tab by mouth daily (with breakfast). , Disp: , Rfl:    Date Last Reviewed:  9/8/2017   EXAMINATION:   Observation/Orthostatic Postural Assessment:          Rounded shoulders, forward head (not significant)  Palpation:          Tenderness over the T1 region and tension in shoulders   ROM:     Cervical       Flexion: decreased ROM with pain       Extension: WFL      R rotation: slightly limited with pain at end range      L rotation: slightly limited with pain at end range       R SB: limited and slightly painful       L SB: limited and slightly painful    Shoulder   Strength:     Cervical       Flexion: 4/5      Extension: 4-/5      R SB: 4/5      L SB: 4/5      R rotation:4/5      L rotation: 4/5   Shoulder    Special Tests:          Compression (-), Spurlings (-), Distraction (+)  Neurological Screen:        Sensation: no compliant of numbness or tingling   Functional Mobility:         Independent unless with migraine   Balance:          Good    Body Structures Involved:  1.  Muscles Body Functions Affected:  1. Mental  2. Sensory/Pain  3. Neuromusculoskeletal  4. Movement Related Activities and Participation Affected:  1. General Tasks and Demands  2. Mobility  3. Community, Social and Civic Life   CLINICAL PRESENTATION:   CLINICAL DECISION MAKING:   Outcome Measure: Tool Used: Neck Disability Index (NDI)  Score:  Initial: 29/50  Most Recent: X/50 (Date: -- )   Interpretation of Score: The Neck Disability Index is a revised form of the Oswestry Low Back Pain Index and is designed to measure the activities of daily living in person's with neck pain. Each section is scored on a 0-5 scale, 5 representing the greatest disability. The scores of each section are added together for a total score of 50. Score 0 1-10 11-20 21-30 31-40 41-49 50   Modifier CH CI CJ CK CL CM CN     Medical Necessity:   · Patient is expected to demonstrate progress in strength and range of motion to increase independence with functional activities daily with less pain. .  Reason for Services/Other Comments:  · Patient continues to require present interventions due to patient's inability to perform functional activities without neck pain. Jani Herndon TREATMENT:   (In addition to Assessment/Re-Assessment sessions the following treatments were rendered)  THERAPEUTIC EXERCISE: (see below for minutes):  Exercises per grid below to improve mobility and strength. Required minimal verbal cues to promote proper body alignment. Progressed resistance, range and repetitions as indicated. MANUAL THERAPY: (see below for minutes): Joint mobilization, Soft tissue mobilization and Manipulation was utilized and necessary because of the patient's restricted joint motion, painful spasm and restricted motion of soft tissue.    MODALITIES: (see below for minutes):      see chart below    MECHANICAL TRACTION: (see below for minutes): Traction was used due to the patient's neck pain and migraine headaches in order to relieve pain in or originating from his spine.    Date: 7-25-17 08/02/17 (visit 2) 8-9-17  (Visit 3)  8-16-17  (visit 4)  8-18-17  (Visit 5)  8-23-17  (Visit 6) 09/08/17 (visit 7)   Modalities:  30  min 30 mins  15 mins  15 mins  15 mins  15 min   MHP with IFC  15 min at beginning of treatment 15 mins (8:45-9) 15 mins  15 mins  15 mins  15 min   Traction  15 min up to 14# pull for muscular stretching at end of session 15 mins to 16# on 2nd level (9:15-9:30)  (Discontinued)                Therapeutic Exercise: 15 mins  25 min   15 mins      Upper trap stretch  x15SH  [id number] B manuall   Repeat  Manual  Manual   Levator stretch  x15SH  [id number] B manually    Manual  manual   Rotation stretch  5SH bilateral  [id number] B manually    Manual  manual   Chin tuck  [id number]   Standing x20   Supine x 20      Alternating isometrics  Into cervical rotation [id number]        Doorway stretch      4x10SH     Proprioceptive Activities:                                        Manual Therapy:  5 min 15 mins  30 mins  15 mins  30 mins  30 min   Soft tissue mobilization  To cervical paraspinals x 5 min 15 mins to extensors and upper traps  30 mins to extensors and upper traps as well as passive stretching  15 mins supine with passive stretching 25 mins supine and seated with passive stretching with occipital release  STM with manual stretching into rotation, lateral flexion to cervical paraspinals   Manual traction with towel       5 mins  Manual gr 3 to 3++   Functional Activities:                                                  HEP: General stretching and ROM in the neck was recommended daily. Pt was educated on TENS unit and was told to purchase more supportive pillow and to use a towel roll under neck until. Treatment/Session Assessment: End range pressures into rotation and lateral flexion are nonprovocative. · Pain/ Symptoms: Initial:   0/10 \"I've had the flu the past week. I did have a migraine Tuesday and Wednesday that was continuous. \"  Post Session:  0/10 ·   Compliance with Program/Exercises: Will assess as treatment progresses. · Recommendations/Intent for next treatment session: \"Next visit will focus on advancements to more challenging activities\".   Total Treatment Duration:  PT Patient Time In/Time Out  Time In: 0800  Time Out: 95[address] [facility]

## 2017-09-13 ENCOUNTER — HOSPITAL ENCOUNTER (OUTPATIENT)
Dept: PHYSICAL THERAPY | Age: 52
Discharge: HOME | End: 2017-09-13
Payer: COMMERCIAL

## 2017-09-13 PROCEDURE — 97014 ELECTRIC STIMULATION THERAPY: CPT

## 2017-09-13 PROCEDURE — 97140 MANUAL THERAPY 1/> REGIONS: CPT

## 2017-09-13 NOTE — PROGRESS NOTES
Claire Lyons  : [id number] [facility]  [address] [place] [address], [address] [place] [address]  [phone]   [phone]        OUTPATIENT PHYSICAL THERAPY:Daily Note 2017      ICD-10: Treatment Diagnosis: Cervicalgia (M54.2) , Headache (R51)  Precautions/Allergies:   Pcn [penicillins] and Shellfish derived   Fall Risk Score: 0 (? 5 = High Risk)  MD Orders: Eval and Treat  MEDICAL/REFERRING DIAGNOSIS:  Migraines [G43.909]  Neck pain [M54.2]   DATE OF ONSET: 2 years ago   REFERRING PHYSICIAN: Yani Dash*  RETURN PHYSICIAN APPOINTMENT: [email or web address]      INITIAL ASSESSMENT:  Ms. Sonam Castillo presents to therapy with pain in the neck and shoulders as well as recurring chronic migraines that are debilitating. Pt is having trouble at times with daily activities due to the neck pain that causes the migraines to come on. Pt would benefit from skilled physical therapy for the above diagnosis to help with decreased tension, and to decrease the amount of episodes of migraines. PROBLEM LIST (Impacting functional limitations):  1. Decreased Strength  2. Decreased ADL/Functional Activities  3. Increased Pain  4. Decreased Activity Tolerance  5. Decreased Flexibility/Joint Mobility INTERVENTIONS PLANNED:  1. Cold  2. Electrical Stimulation  3. Heat  4. Home Exercise Program (HEP)  5. Manual Therapy  6. Range of Motion (ROM)  7. Therapeutic Exercise/Strengthening  8. Ultrasound (US)   TREATMENT PLAN:  Effective Dates: 17 TO 9-15-17. Frequency/Duration: 2 times a week for 7 weeks  GOALS: (Goals have been discussed and agreed upon with patient.)  Short-Term Functional Goals: Time Frame: 2 weeks   1. Ms. Sonam Castillo to be independent with HEP. 2. Pt able to tolerate maintaining proper posture in sitting without VCs and no increase in pain in the neck or shoulders for >30 mins. 3. Pt to improve cervical ROM without pain to improve driving safety.    Discharge Goals: Time Frame: 7 weeks   1. Pt able to tolerate daily functional activities without pain greater than 3/10 in the neck or shoulders. 2. Pt able to sleep throughout night without waking from pain in the head or neck. 3. Pt to report less debilitating migraines to less than 2 days. Rehabilitation Potential For Stated Goals: Fair                HISTORY:   History of Present Injury/Illness (Reason for Referral):  Pt 49 y/o F with pain in the neck and head with migraines that were debilitating causing her to go out of work on disability 2 years ago. She has been getting injections in her neck and has had therapy before 2 years ago. She is still seeing pain management for the injections. She is getting migraines about 1-2 times a week and is lasting 3-4 days at a time. She is currently out of work   Past Medical History/Comorbidities:   Ms. Christain Severe  has a past medical history of Anxiety attack; Borderline personality disorder (10/8/2014); Chicken pox; Cystocele; Esophageal reflux (10/8/2014); Hypercholesteremia; Measles; Migraine with aura, without mention of intractable migraine without mention of status migrainosus (10/8/2014); Migraines; Morbid obesity ([facility] [address] Mumps; Ovarian cyst; Pneumonia; Rectocele; Stress incontinence (2007); Symptomatic menopausal or female climacteric states (10/8/2014); Unspecified essential hypertension (10/8/2014); Unspecified hypothyroidism; and Urge incontinence (2007). Ms. Christain Severe  has a past surgical history that includes urological (2007); gastrectomy (02/06/12); hysterectomy (1998); salpingo-oophorectomy (2003); oophorectomy (2003); hysterectomy (2000); orthopaedic; and orthopaedic (04/2015).   Social History/Living Environment:    Lives alone   Prior Level of Function/Work/Activity:  Working toward long term disability   Dominant Side:         RIGHT  Other Clinical Tests:          xrays and MRIs have been done previously   Previous Treatment Approaches:          Therapy 2 years ago for neck pain   Personal Factors:          Past/Current Experience:  Migraines for the past 2 years   Current Medications:    Current Outpatient Prescriptions:     verapamil ER (VERELAN PM) 100 mg capsule, Take 1 Cap by mouth nightly., Disp: 90 Cap, Rfl: 1    ezetimibe-simvastatin (VYTORIN) 10-20 mg per tablet, Take 1 Tab by mouth nightly., Disp: 90 Tab, Rfl: 1    SYNTHROID 125 mcg tablet, Take 1 Tab by mouth Daily (before breakfast). GEMINI, Disp: 90 Tab, Rfl: 1    potassium chloride (KLOR-CON M20) 20 mEq tablet, Take 1 Tab by mouth two (2) times a day., Disp: 180 Tab, Rfl: 1    indapamide (LOZOL) 2.5 mg tablet, Take 1 Tab by mouth every morning., Disp: 90 Tab, Rfl: 1    colestipol (COLESTID) 1 gram tablet, Take 1 Tab by mouth two (2) times a day., Disp: 180 Tab, Rfl: 1    propranolol LA (INDERAL LA) 80 mg SR capsule, Take 1 Cap by mouth daily. , Disp: 90 Cap, Rfl: 1    LORazepam (ATIVAN) 1 mg tablet, 1-2 daily prn, Disp: 90 Tab, Rfl: 1    nortriptyline (PAMELOR) 25 mg capsule, 1 po QPM  Indications: NEUROPATHIC PAIN, Disp: 90 Cap, Rfl: 1    tiZANidine (ZANAFLEX) 2 mg tablet, Take 2 Tabs by mouth nightly as needed. Takes 1-2 tablets as needed at bedtime, Disp: 90 Tab, Rfl: 1    estradiol acetate (FEMRING) 0.05 mg/24 hr ring, Insert 1 Units into vagina every three (3) months., Disp: 1 Each, Rfl: 4    nystatin-triamcinolone (MYCOLOG II) topical cream, Apply  to affected area two (2) times a day., Disp: 30 g, Rfl: 3    butalbital-acetaminophen-caffeine (ESGIC PLUS) -40 mg per tablet, Take 1 Tab by mouth two (2) times a day. Indications: MIGRAINE, TENSION-TYPE HEADACHE, Disp: , Rfl:     SUMAtriptan Succinate (SUMAVEL DOSEPRO) 6 mg/0.5 mL nfIj, Inject SC at onset of migraine. Repeat dose after 2 hrs if needed. Max 2 injections/24 hrs. Indications: MIGRAINE, Disp: 18 Syringe, Rfl: 1    eletriptan (RELPAX) 40 mg tablet, 1 po at onset of migraine and repeat after 2 hrs. If needed.   Max dose 2 tabs/24 hrs., Disp: 18 Tab, Rfl: 1    HYDROcodone-acetaminophen (NORCO) 5-325 mg per tablet, Take 1 Tab by mouth every four (4) hours as needed for Pain., Disp: , Rfl:     Diclofenac Potassium (CAMBIA) 50 mg pwpk, Mix 1 packet in 1-2 oz of water and drink at onset of migraine (max: 1 packet/day)  Indications: MIGRAINE, Disp: 9 Packet, Rfl: 11    triamcinolone (NASACORT AQ) 55 mcg nasal inhaler, , Disp: , Rfl: 1    promethazine (PHENERGAN) 25 mg tablet, Take 1 Tab by mouth every eight (8) hours as needed for Nausea., Disp: 30 Tab, Rfl: 2    multivitamin (ONE A DAY) tablet, Take 1 Tab by mouth daily. Last dose 4 days ago, Disp: , Rfl:     aspirin 81 mg tablet, Take 81 mg by mouth daily. Last dose 5 days ago, Disp: , Rfl:     vitamin e (E GEMS) 1,000 unit capsule, Take 1,000 Units by mouth daily. Last dose 4 days ago, Disp: , Rfl:     calcium citrate-vitamin d3 (CITRACAL + D) 315-200 mg-unit Tab, Take 1 Tab by mouth daily (with breakfast). , Disp: , Rfl:    Date Last Reviewed:  9/13/2017   EXAMINATION:   Observation/Orthostatic Postural Assessment:          Rounded shoulders, forward head (not significant)  Palpation:          Tenderness over the T1 region and tension in shoulders   ROM:     Cervical       Flexion: decreased ROM with pain       Extension: WFL      R rotation: slightly limited with pain at end range      L rotation: slightly limited with pain at end range       R SB: limited and slightly painful       L SB: limited and slightly painful    Shoulder   Strength:     Cervical       Flexion: 4/5      Extension: 4-/5      R SB: 4/5      L SB: 4/5      R rotation:4/5      L rotation: 4/5   Shoulder    Special Tests:          Compression (-), Spurlings (-), Distraction (+)  Neurological Screen:        Sensation: no compliant of numbness or tingling   Functional Mobility:         Independent unless with migraine   Balance:          Good    Body Structures Involved:  1.  Muscles Body Functions Affected:  1. Mental  2. Sensory/Pain  3. Neuromusculoskeletal  4. Movement Related Activities and Participation Affected:  1. General Tasks and Demands  2. Mobility  3. Community, Social and Civic Life   CLINICAL PRESENTATION:   CLINICAL DECISION MAKING:   Outcome Measure: Tool Used: Neck Disability Index (NDI)  Score:  Initial: 29/50  Most Recent: X/50 (Date: -- )   Interpretation of Score: The Neck Disability Index is a revised form of the Oswestry Low Back Pain Index and is designed to measure the activities of daily living in person's with neck pain. Each section is scored on a 0-5 scale, 5 representing the greatest disability. The scores of each section are added together for a total score of 50. Score 0 1-10 11-20 21-30 31-40 41-49 50   Modifier CH CI CJ CK CL CM CN     Medical Necessity:   · Patient is expected to demonstrate progress in strength and range of motion to increase independence with functional activities daily with less pain. .  Reason for Services/Other Comments:  · Patient continues to require present interventions due to patient's inability to perform functional activities without neck pain. Naoma Broccoli TREATMENT:   (In addition to Assessment/Re-Assessment sessions the following treatments were rendered)  THERAPEUTIC EXERCISE: (see below for minutes):  Exercises per grid below to improve mobility and strength. Required minimal verbal cues to promote proper body alignment. Progressed resistance, range and repetitions as indicated. MANUAL THERAPY: (see below for minutes): Joint mobilization, Soft tissue mobilization and Manipulation was utilized and necessary because of the patient's restricted joint motion, painful spasm and restricted motion of soft tissue.    MODALITIES: (see below for minutes):      see chart below    MECHANICAL TRACTION: (see below for minutes): Traction was used due to the patient's neck pain and migraine headaches in order to relieve pain in or originating from his spine.    Date: 7-25-17 08/02/17 (visit 2) 8-9-17  (Visit 3)  8-16-17  (visit 4)  8-18-17  (Visit 5)  8-23-17  (Visit 6) 09/08/17 (visit 7) 9-13-17  (Visit 8)    Modalities:  30  min 30 mins  15 mins  15 mins  15 mins  15 min 15 mins    MHP with IFC  15 min at beginning of treatment 15 mins (8:45-9) 15 mins  15 mins  15 mins  15 min 15 mins prior to tx    Traction  15 min up to 14# pull for muscular stretching at end of session 15 mins to 16# on 2nd level (9:15-9:30)  (Discontinued)                  Therapeutic Exercise: 15 mins  25 min   15 mins       Upper trap stretch  x15SH  [id number] B manuall   Repeat  Manual  Manual Manual    Levator stretch  x15SH  [id number] B manually    Manual  manual Manual    Rotation stretch  5SH bilateral  [id number] B manually    Manual  manual Manual    Chin tuck  [id number]   Standing x20   Supine x 20       Alternating isometrics  Into cervical rotation [id number]         Doorway stretch      4x10SH      Proprioceptive Activities:                                            Manual Therapy:  5 min 15 mins  30 mins  15 mins  30 mins  30 min 25 mins    Soft tissue mobilization  To cervical paraspinals x 5 min 15 mins to extensors and upper traps  30 mins to extensors and upper traps as well as passive stretching  15 mins supine with passive stretching 25 mins supine and seated with passive stretching with occipital release  STM with manual stretching into rotation, lateral flexion to cervical paraspinals Very light STM with manual stretching due to having injection on Monday. Manual traction with towel       5 mins  Manual gr 3 to 3++    Functional Activities:                                                       HEP: General stretching and ROM in the neck was recommended daily. Pt was educated on TENS unit and was told to purchase more supportive pillow and to use a towel roll under neck until.   Treatment/Session Assessment: Pt had injection done Monday and is feeling better and has not had any migraines since the injection. STM and light PROM was done today to not cause any provocation of pain. Continue with POC. · Pain/ Symptoms: Initial:   0/10 \"Im feeling pretty good today. \"  Post Session:  0/10  ·   Compliance with Program/Exercises: Will assess as treatment progresses. · Recommendations/Intent for next treatment session: \"Next visit will focus on advancements to more challenging activities\".   Total Treatment Duration:  PT Patient Time In/Time Out  Time In: 0930  Time Out: [address], PT, DPT

## 2017-09-15 ENCOUNTER — HOSPITAL ENCOUNTER (OUTPATIENT)
Dept: PHYSICAL THERAPY | Age: 52
Discharge: HOME | End: 2017-09-15
Payer: COMMERCIAL

## 2017-09-20 ENCOUNTER — HOSPITAL ENCOUNTER (OUTPATIENT)
Dept: PHYSICAL THERAPY | Age: 52
Discharge: HOME | End: 2017-09-20
Payer: COMMERCIAL

## 2017-09-20 PROCEDURE — 97140 MANUAL THERAPY 1/> REGIONS: CPT

## 2017-09-20 PROCEDURE — 97014 ELECTRIC STIMULATION THERAPY: CPT

## 2017-09-20 PROCEDURE — 97110 THERAPEUTIC EXERCISES: CPT

## 2017-09-20 NOTE — PROGRESS NOTES
Claire Lyons  : [id number] [facility],[address]  [address] [place] [address]  [phone]   [phone]        OUTPATIENT PHYSICAL THERAPY:Daily Note and Recertification 3/57/4132      ICD-10: Treatment Diagnosis: Cervicalgia (M54.2) , Headache (R51)  Precautions/Allergies:   Pcn [penicillins] and Shellfish derived   Fall Risk Score: 0 (? 5 = High Risk)  MD Orders: Eval and Treat  MEDICAL/REFERRING DIAGNOSIS:  Migraines [G43.909]  Neck pain [M54.2]   DATE OF ONSET: 2 years ago   REFERRING PHYSICIAN: Yani Dash*  RETURN PHYSICIAN APPOINTMENT: tbd      INITIAL ASSESSMENT:  Ms. Tia Paul presents to therapy with pain in the neck and shoulders as well as recurring chronic migraines that are debilitating. Pt is having trouble at times with daily activities due to the neck pain that causes the migraines to come on. Pt would benefit from skilled physical therapy for the above diagnosis to help with decreased tension, and to decrease the amount of episodes of migraines. PROBLEM LIST (Impacting functional limitations):  1. Decreased Strength  2. Decreased ADL/Functional Activities  3. Increased Pain  4. Decreased Activity Tolerance  5. Decreased Flexibility/Joint Mobility INTERVENTIONS PLANNED:  1. Cold  2. Electrical Stimulation  3. Heat  4. Home Exercise Program (HEP)  5. Manual Therapy  6. Range of Motion (ROM)  7. Therapeutic Exercise/Strengthening  8. Ultrasound (US)   TREATMENT PLAN:  Effective Dates: 17 through 17  Frequency/Duration: 2 times a week for 2  weeks  GOALS: (Goals have been discussed and agreed upon with patient.)  Short-Term Functional Goals: Time Frame: 2 weeks   1. Ms. Tia aPul to be independent with HEP. (MET)  2. Pt able to tolerate maintaining proper posture in sitting without VCs and no increase in pain in the neck or shoulders for >30 mins. (MET)  3.  Pt to improve cervical ROM without pain to improve driving safety. (MET)  Discharge Goals: Time Frame: 7 weeks   1. Pt able to tolerate daily functional activities without pain greater than 3/10 in the neck or shoulders. (Not met)   2. Pt able to sleep throughout night without waking from pain in the head or neck. (Not met)   3. Pt to report less debilitating migraines to less than 2 days. (Not met)   Rehabilitation Potential For Stated Goals: Fair    Regarding Claire Lyons's therapy, I certify that the treatment plan above will be carried out by a therapist or under their direction. Thank you for this referral,  Frank Navarrete, PT, DPT     Referring Physician Signature: Matthew Ball*          Date                          HISTORY:   History of Present Injury/Illness (Reason for Referral):  Pt 51 y/o F with pain in the neck and head with migraines that were debilitating causing her to go out of work on disability 2 years ago. She has been getting injections in her neck and has had therapy before 2 years ago. She is still seeing pain management for the injections. She is getting migraines about 1-2 times a week and is lasting 3-4 days at a time. She is currently out of work   Past Medical History/Comorbidities:   Ms. Nikki Dupont  has a past medical history of Anxiety attack; Borderline personality disorder (10/8/2014); Chicken pox; Cystocele; Esophageal reflux (10/8/2014); Hypercholesteremia; Measles; Migraine with aura, without mention of intractable migraine without mention of status migrainosus (10/8/2014); Migraines; Morbid obesity ([facility] [address] Mumps; Ovarian cyst; Pneumonia; Rectocele; Stress incontinence (2007); Symptomatic menopausal or female climacteric states (10/8/2014); Unspecified essential hypertension (10/8/2014); Unspecified hypothyroidism; and Urge incontinence (2007).   Ms. Nikki Dupont  has a past surgical history that includes urological (2007); gastrectomy (02/06/12); hysterectomy (1998); salpingo-oophorectomy (2003); oophorectomy (2003); hysterectomy (2000); orthopaedic; and orthopaedic (04/2015). Social History/Living Environment:    Lives alone   Prior Level of Function/Work/Activity:  Working toward long term disability   Dominant Side:         RIGHT  Other Clinical Tests:          xrays and MRIs have been done previously   Previous Treatment Approaches:          Therapy 2 years ago for neck pain   Personal Factors:          Past/Current Experience:  Migraines for the past 2 years   Current Medications:    Current Outpatient Prescriptions:     verapamil ER (VERELAN PM) 100 mg capsule, Take 1 Cap by mouth nightly., Disp: 90 Cap, Rfl: 1    ezetimibe-simvastatin (VYTORIN) 10-20 mg per tablet, Take 1 Tab by mouth nightly., Disp: 90 Tab, Rfl: 1    SYNTHROID 125 mcg tablet, Take 1 Tab by mouth Daily (before breakfast). GEMINI, Disp: 90 Tab, Rfl: 1    potassium chloride (KLOR-CON M20) 20 mEq tablet, Take 1 Tab by mouth two (2) times a day., Disp: 180 Tab, Rfl: 1    indapamide (LOZOL) 2.5 mg tablet, Take 1 Tab by mouth every morning., Disp: 90 Tab, Rfl: 1    colestipol (COLESTID) 1 gram tablet, Take 1 Tab by mouth two (2) times a day., Disp: 180 Tab, Rfl: 1    propranolol LA (INDERAL LA) 80 mg SR capsule, Take 1 Cap by mouth daily. , Disp: 90 Cap, Rfl: 1    LORazepam (ATIVAN) 1 mg tablet, 1-2 daily prn, Disp: 90 Tab, Rfl: 1    nortriptyline (PAMELOR) 25 mg capsule, 1 po QPM  Indications: NEUROPATHIC PAIN, Disp: 90 Cap, Rfl: 1    tiZANidine (ZANAFLEX) 2 mg tablet, Take 2 Tabs by mouth nightly as needed. Takes 1-2 tablets as needed at bedtime, Disp: 90 Tab, Rfl: 1    estradiol acetate (FEMRING) 0.05 mg/24 hr ring, Insert 1 Units into vagina every three (3) months., Disp: 1 Each, Rfl: 4    nystatin-triamcinolone (MYCOLOG II) topical cream, Apply  to affected area two (2) times a day., Disp: 30 g, Rfl: 3    butalbital-acetaminophen-caffeine (ESGIC PLUS) -40 mg per tablet, Take 1 Tab by mouth two (2) times a day.  Indications: MIGRAINE, TENSION-TYPE HEADACHE, Disp: , Rfl:     SUMAtriptan Succinate (SUMAVEL DOSEPRO) 6 mg/0.5 mL nfIj, Inject SC at onset of migraine. Repeat dose after 2 hrs if needed. Max 2 injections/24 hrs. Indications: MIGRAINE, Disp: 18 Syringe, Rfl: 1    eletriptan (RELPAX) 40 mg tablet, 1 po at onset of migraine and repeat after 2 hrs. If needed. Max dose 2 tabs/24 hrs., Disp: 18 Tab, Rfl: 1    HYDROcodone-acetaminophen (NORCO) 5-325 mg per tablet, Take 1 Tab by mouth every four (4) hours as needed for Pain., Disp: , Rfl:     Diclofenac Potassium (CAMBIA) 50 mg pwpk, Mix 1 packet in 1-2 oz of water and drink at onset of migraine (max: 1 packet/day)  Indications: MIGRAINE, Disp: 9 Packet, Rfl: 11    triamcinolone (NASACORT AQ) 55 mcg nasal inhaler, , Disp: , Rfl: 1    promethazine (PHENERGAN) 25 mg tablet, Take 1 Tab by mouth every eight (8) hours as needed for Nausea., Disp: 30 Tab, Rfl: 2    multivitamin (ONE A DAY) tablet, Take 1 Tab by mouth daily. Last dose 4 days ago, Disp: , Rfl:     aspirin 81 mg tablet, Take 81 mg by mouth daily. Last dose 5 days ago, Disp: , Rfl:     vitamin e (E GEMS) 1,000 unit capsule, Take 1,000 Units by mouth daily. Last dose 4 days ago, Disp: , Rfl:     calcium citrate-vitamin d3 (CITRACAL + D) 315-200 mg-unit Tab, Take 1 Tab by mouth daily (with breakfast). , Disp: , Rfl:    Date Last Reviewed:  9/20/2017   EXAMINATION:   Observation/Orthostatic Postural Assessment:          Rounded shoulders, forward head (not significant)  Palpation:          Tenderness over the T1 region and tension in shoulders   ROM:     Cervical       Flexion: decreased ROM with pain       Extension: WFL      R rotation: slightly limited with pain at end range      L rotation: slightly limited with pain at end range       R SB: limited and slightly painful       L SB: limited and slightly painful    Shoulder   Strength:     Cervical       Flexion: 4/5      Extension: 4-/5      R SB: 4/5      L SB: 4/5      R rotation:4/5      L rotation: 4/5   Shoulder    Special Tests:          Compression (-), Spurlings (-), Distraction (+)  Neurological Screen:        Sensation: no compliant of numbness or tingling   Functional Mobility:         Independent unless with migraine   Balance:          Good    Body Structures Involved:  1. Muscles Body Functions Affected:  1. Mental  2. Sensory/Pain  3. Neuromusculoskeletal  4. Movement Related Activities and Participation Affected:  1. General Tasks and Demands  2. Mobility  3. Community, Social and Civic Life   CLINICAL PRESENTATION:   CLINICAL DECISION MAKING:   Outcome Measure: Tool Used: Neck Disability Index (NDI)  Score:  Initial: 29/50  Most Recent: X/50 (Date: -- )   Interpretation of Score: The Neck Disability Index is a revised form of the Oswestry Low Back Pain Index and is designed to measure the activities of daily living in person's with neck pain. Each section is scored on a 0-5 scale, 5 representing the greatest disability. The scores of each section are added together for a total score of 50. Score 0 1-10 11-20 21-30 31-40 41-49 50   Modifier CH CI CJ CK CL CM CN     Medical Necessity:   · Patient is expected to demonstrate progress in strength and range of motion to increase independence with functional activities daily with less pain. .  Reason for Services/Other Comments:  · Patient continues to require present interventions due to patient's inability to perform functional activities without neck pain. [place] TREATMENT:   (In addition to Assessment/Re-Assessment sessions the following treatments were rendered)  THERAPEUTIC EXERCISE: (see below for minutes):  Exercises per grid below to improve mobility and strength. Required minimal verbal cues to promote proper body alignment. Progressed resistance, range and repetitions as indicated.   MANUAL THERAPY: (see below for minutes): Joint mobilization, Soft tissue mobilization and Manipulation was utilized and necessary because of the patient's restricted joint motion, painful spasm and restricted motion of soft tissue. MODALITIES: (see below for minutes):      see chart below    MECHANICAL TRACTION: (see below for minutes): Traction was used due to the patient's neck pain and migraine headaches in order to relieve pain in or originating from his spine.     Date: 7-25-17 08/02/17 (visit 2) 8-9-17  (Visit 3)  8-16-17  (visit 4)  8-18-17  (Visit 5)  8-23-17  (Visit 6) 09/08/17 (visit 7) 9-13-17  (Visit 8)  9-20-17  (visit 9)   Modalities:  30  min 30 mins  15 mins  15 mins  15 mins  15 min 15 mins  15 mins   MHP with IFC  15 min at beginning of treatment 15 mins (8:45-9) 15 mins  15 mins  15 mins  15 min 15 mins prior to tx  15 mins prior to tx    Traction  15 min up to 14# pull for muscular stretching at end of session 15 mins to 16# on 2nd level (9:15-9:30)  (Discontinued)                    Therapeutic Exercise: 15 mins  25 min   15 mins     10 mins   Upper trap stretch  x15SH  [id number] B manuall   Repeat  Manual  Manual Manual  Manual    Levator stretch  x15SH  [id number] B manually    Manual  manual Manual  Manual    Rotation stretch  5SH bilateral  [id number] B manually    Manual  manual Manual  Manual    Chin tuck  [id number]   Standing x20   Supine x 20        Horizontal abduction with tband          3x10    Alternating isometrics  Into cervical rotation [id number]          Doorway stretch      4x10SH    Repeat    Proprioceptive Activities:                                                Manual Therapy:  5 min 15 mins  30 mins  15 mins  30 mins  30 min 25 mins  20 mins    Soft tissue mobilization  To cervical paraspinals x 5 min 15 mins to extensors and upper traps  30 mins to extensors and upper traps as well as passive stretching  15 mins supine with passive stretching 25 mins supine and seated with passive stretching with occipital release  STM with manual stretching into rotation, lateral flexion to cervical paraspinals Very light STM with manual stretching due to having injection on Monday. Repeat in sitting   Manual traction with towel       5 mins  Manual gr 3 to 3++     Functional Activities:                                                            HEP: General stretching and ROM in the neck was recommended daily. Pt was educated on TENS unit and was told to purchase more supportive pillow and to use a towel roll under neck until. Treatment/Session Assessment: Pt tolerated the exercises today and was told to continue with the stretching and the modalities as needed. Pt will need an FCE soon and will need to schedule. · Pain/ Symptoms: Initial:   0/10 \"Im not having any migraines since the shot. \"  Post Session:  0/10  ·   Compliance with Program/Exercises: Will assess as treatment progresses. · Recommendations/Intent for next treatment session: \"Next visit will focus on advancements to more challenging activities\".   Total Treatment Duration:  PT Patient Time In/Time Out  Time In: 0200  Time Out: [address], PT, DPT

## 2017-09-22 ENCOUNTER — HOSPITAL ENCOUNTER (OUTPATIENT)
Dept: PHYSICAL THERAPY | Age: 52
Discharge: HOME | End: 2017-09-22
Payer: COMMERCIAL

## 2017-09-22 PROCEDURE — 97014 ELECTRIC STIMULATION THERAPY: CPT

## 2017-09-22 PROCEDURE — 97110 THERAPEUTIC EXERCISES: CPT

## 2017-09-22 PROCEDURE — 97140 MANUAL THERAPY 1/> REGIONS: CPT

## 2017-09-22 NOTE — PROGRESS NOTES
Claire Lyons  : [phone] [facility] [address]  [address] [place] [address].  [phone]   [phone]        OUTPATIENT PHYSICAL THERAPY:Daily Note 2017      ICD-10: Treatment Diagnosis: Cervicalgia (M54.2) , Headache (R51)  Precautions/Allergies:   Pcn [penicillins] and Shellfish derived   Fall Risk Score: 0 (? 5 = High Risk)  MD Orders: Eval and Treat  MEDICAL/REFERRING DIAGNOSIS:  Migraines [G43.909]  Neck pain [M54.2]   DATE OF ONSET: 2 years ago   REFERRING PHYSICIAN: Yani Dash*  RETURN PHYSICIAN APPOINTMENT: [email or web address]      INITIAL ASSESSMENT:  Ms. Tia Paul presents to therapy with pain in the neck and shoulders as well as recurring chronic migraines that are debilitating. Pt is having trouble at times with daily activities due to the neck pain that causes the migraines to come on. Pt would benefit from skilled physical therapy for the above diagnosis to help with decreased tension, and to decrease the amount of episodes of migraines. PROBLEM LIST (Impacting functional limitations):  1. Decreased Strength  2. Decreased ADL/Functional Activities  3. Increased Pain  4. Decreased Activity Tolerance  5. Decreased Flexibility/Joint Mobility INTERVENTIONS PLANNED:  1. Cold  2. Electrical Stimulation  3. Heat  4. Home Exercise Program (HEP)  5. Manual Therapy  6. Range of Motion (ROM)  7. Therapeutic Exercise/Strengthening  8. Ultrasound (US)   TREATMENT PLAN:  Effective Dates: 17 TO 9-15-17. Frequency/Duration: 2 times a week for 7 weeks  GOALS: (Goals have been discussed and agreed upon with patient.)  Short-Term Functional Goals: Time Frame: 2 weeks   1. Ms. Tia Paul to be independent with HEP. 2. Pt able to tolerate maintaining proper posture in sitting without VCs and no increase in pain in the neck or shoulders for >30 mins. 3. Pt to improve cervical ROM without pain to improve driving safety.    Discharge Goals: Time Frame: 7 weeks   1. Pt able to tolerate daily functional activities without pain greater than 3/10 in the neck or shoulders. 2. Pt able to sleep throughout night without waking from pain in the head or neck. 3. Pt to report less debilitating migraines to less than 2 days. Rehabilitation Potential For Stated Goals: Fair                HISTORY:   History of Present Injury/Illness (Reason for Referral):  Pt 51 y/o F with pain in the neck and head with migraines that were debilitating causing her to go out of work on disability 2 years ago. She has been getting injections in her neck and has had therapy before 2 years ago. She is still seeing pain management for the injections. She is getting migraines about 1-2 times a week and is lasting 3-4 days at a time. She is currently out of work   Past Medical History/Comorbidities:   Ms. Christain Severe  has a past medical history of Anxiety attack; Borderline personality disorder (10/8/2014); Chicken pox; Cystocele; Esophageal reflux (10/8/2014); Hypercholesteremia; Measles; Migraine with aura, without mention of intractable migraine without mention of status migrainosus (10/8/2014); Migraines; Morbid obesity ([facility] [address] Mumps; Ovarian cyst; Pneumonia; Rectocele; Stress incontinence (2007); Symptomatic menopausal or female climacteric states (10/8/2014); Unspecified essential hypertension (10/8/2014); Unspecified hypothyroidism; and Urge incontinence (2007). Ms. Christain Severe  has a past surgical history that includes urological (2007); gastrectomy (02/06/12); hysterectomy (1998); salpingo-oophorectomy (2003); oophorectomy (2003); hysterectomy (2000); orthopaedic; and orthopaedic (04/2015).   Social History/Living Environment:    Lives alone   Prior Level of Function/Work/Activity:  Working toward long term disability   Dominant Side:         RIGHT  Other Clinical Tests:          xrays and MRIs have been done previously   Previous Treatment Approaches:          Therapy 2 years ago for neck pain   Personal Factors:          Past/Current Experience:  Migraines for the past 2 years   Current Medications:    Current Outpatient Prescriptions:     verapamil ER (VERELAN PM) 100 mg capsule, Take 1 Cap by mouth nightly., Disp: 90 Cap, Rfl: 1    ezetimibe-simvastatin (VYTORIN) 10-20 mg per tablet, Take 1 Tab by mouth nightly., Disp: 90 Tab, Rfl: 1    SYNTHROID 125 mcg tablet, Take 1 Tab by mouth Daily (before breakfast). GEMINI, Disp: 90 Tab, Rfl: 1    potassium chloride (KLOR-CON M20) 20 mEq tablet, Take 1 Tab by mouth two (2) times a day., Disp: 180 Tab, Rfl: 1    indapamide (LOZOL) 2.5 mg tablet, Take 1 Tab by mouth every morning., Disp: 90 Tab, Rfl: 1    colestipol (COLESTID) 1 gram tablet, Take 1 Tab by mouth two (2) times a day., Disp: 180 Tab, Rfl: 1    propranolol LA (INDERAL LA) 80 mg SR capsule, Take 1 Cap by mouth daily. , Disp: 90 Cap, Rfl: 1    LORazepam (ATIVAN) 1 mg tablet, 1-2 daily prn, Disp: 90 Tab, Rfl: 1    nortriptyline (PAMELOR) 25 mg capsule, 1 po QPM  Indications: NEUROPATHIC PAIN, Disp: 90 Cap, Rfl: 1    tiZANidine (ZANAFLEX) 2 mg tablet, Take 2 Tabs by mouth nightly as needed. Takes 1-2 tablets as needed at bedtime, Disp: 90 Tab, Rfl: 1    estradiol acetate (FEMRING) 0.05 mg/24 hr ring, Insert 1 Units into vagina every three (3) months., Disp: 1 Each, Rfl: 4    nystatin-triamcinolone (MYCOLOG II) topical cream, Apply  to affected area two (2) times a day., Disp: 30 g, Rfl: 3    butalbital-acetaminophen-caffeine (ESGIC PLUS) -40 mg per tablet, Take 1 Tab by mouth two (2) times a day. Indications: MIGRAINE, TENSION-TYPE HEADACHE, Disp: , Rfl:     SUMAtriptan Succinate (SUMAVEL DOSEPRO) 6 mg/0.5 mL nfIj, Inject SC at onset of migraine. Repeat dose after 2 hrs if needed. Max 2 injections/24 hrs. Indications: MIGRAINE, Disp: 18 Syringe, Rfl: 1    eletriptan (RELPAX) 40 mg tablet, 1 po at onset of migraine and repeat after 2 hrs. If needed.   Max dose 2 tabs/24 hrs., Disp: 18 Tab, Rfl: 1    HYDROcodone-acetaminophen (NORCO) 5-325 mg per tablet, Take 1 Tab by mouth every four (4) hours as needed for Pain., Disp: , Rfl:     Diclofenac Potassium (CAMBIA) 50 mg pwpk, Mix 1 packet in 1-2 oz of water and drink at onset of migraine (max: 1 packet/day)  Indications: MIGRAINE, Disp: 9 Packet, Rfl: 11    triamcinolone (NASACORT AQ) 55 mcg nasal inhaler, , Disp: , Rfl: 1    promethazine (PHENERGAN) 25 mg tablet, Take 1 Tab by mouth every eight (8) hours as needed for Nausea., Disp: 30 Tab, Rfl: 2    multivitamin (ONE A DAY) tablet, Take 1 Tab by mouth daily. Last dose 4 days ago, Disp: , Rfl:     aspirin 81 mg tablet, Take 81 mg by mouth daily. Last dose 5 days ago, Disp: , Rfl:     vitamin e (E GEMS) 1,000 unit capsule, Take 1,000 Units by mouth daily. Last dose 4 days ago, Disp: , Rfl:     calcium citrate-vitamin d3 (CITRACAL + D) 315-200 mg-unit Tab, Take 1 Tab by mouth daily (with breakfast). , Disp: , Rfl:    Date Last Reviewed:  9/22/2017   EXAMINATION:   Observation/Orthostatic Postural Assessment:          Rounded shoulders, forward head (not significant)  Palpation:          Tenderness over the T1 region and tension in shoulders   ROM:     Cervical       Flexion: decreased ROM with pain       Extension: WFL      R rotation: slightly limited with pain at end range      L rotation: slightly limited with pain at end range       R SB: limited and slightly painful       L SB: limited and slightly painful    Shoulder   Strength:     Cervical       Flexion: 4/5      Extension: 4-/5      R SB: 4/5      L SB: 4/5      R rotation:4/5      L rotation: 4/5   Shoulder    Special Tests:          Compression (-), Spurlings (-), Distraction (+)  Neurological Screen:        Sensation: no compliant of numbness or tingling   Functional Mobility:         Independent unless with migraine   Balance:          Good    Body Structures Involved:  1.  Muscles Body Functions Affected:  1. Mental  2. Sensory/Pain  3. Neuromusculoskeletal  4. Movement Related Activities and Participation Affected:  1. General Tasks and Demands  2. Mobility  3. Community, Social and Civic Life   CLINICAL PRESENTATION:   CLINICAL DECISION MAKING:   Outcome Measure: Tool Used: Neck Disability Index (NDI)  Score:  Initial: 29/50  Most Recent: X/50 (Date: -- )   Interpretation of Score: The Neck Disability Index is a revised form of the Oswestry Low Back Pain Index and is designed to measure the activities of daily living in person's with neck pain. Each section is scored on a 0-5 scale, 5 representing the greatest disability. The scores of each section are added together for a total score of 50. Score 0 1-10 11-20 21-30 31-40 41-49 50   Modifier CH CI CJ CK CL CM CN     Medical Necessity:   · Patient is expected to demonstrate progress in strength and range of motion to increase independence with functional activities daily with less pain. .  Reason for Services/Other Comments:  · Patient continues to require present interventions due to patient's inability to perform functional activities without neck pain. [place] TREATMENT:   (In addition to Assessment/Re-Assessment sessions the following treatments were rendered)  THERAPEUTIC EXERCISE: (see below for minutes):  Exercises per grid below to improve mobility and strength. Required minimal verbal cues to promote proper body alignment. Progressed resistance, range and repetitions as indicated. MANUAL THERAPY: (see below for minutes): Joint mobilization, Soft tissue mobilization and Manipulation was utilized and necessary because of the patient's restricted joint motion, painful spasm and restricted motion of soft tissue.    MODALITIES: (see below for minutes):      see chart below    MECHANICAL TRACTION: (see below for minutes): Traction was used due to the patient's neck pain and migraine headaches in order to relieve pain in or originating from his spine.    Date: 7-25-17 08/02/17 (visit 2) 8-9-17  (Visit 3)  8-16-17  (visit 4)  8-18-17  (Visit 5)  8-23-17  (Visit 6) 09/08/17 (visit 7) 9-13-17  (Visit 8)  9-20-17  (visit 9) 9-22-17  (Visit 10)    Modalities:  30  min 30 mins  15 mins  15 mins  15 mins  15 min 15 mins  15 mins 15 mins    MHP with IFC  15 min at beginning of treatment 15 mins (8:45-9) 15 mins  15 mins  15 mins  15 min 15 mins prior to tx  15 mins prior to tx  15 mins prior to tx    Traction  15 min up to 14# pull for muscular stretching at end of session 15 mins to 16# on 2nd level (9:15-9:30)  (Discontinued)                      Therapeutic Exercise: 15 mins  25 min   15 mins     10 mins 10 mins    Upper trap stretch  x15SH  [id number] B manuall   Repeat  Manual  Manual Manual  Manual  Manual    Levator stretch  x15SH  [id number] B manually    Manual  manual Manual  Manual  Manual    Rotation stretch  5SH bilateral  [id number] B manually    Manual  manual Manual  Manual  Manual    Chin tuck  [id number]   Standing x20   Supine x 20         Horizontal abduction with tband          3x10  Repeat    Alternating isometrics  Into cervical rotation [id number]           Doorway stretch      4x10SH    Repeat  Repeat    Proprioceptive Activities:                                                    Manual Therapy:  5 min 15 mins  30 mins  15 mins  30 mins  30 min 25 mins  20 mins  20 mins    Soft tissue mobilization  To cervical paraspinals x 5 min 15 mins to extensors and upper traps  30 mins to extensors and upper traps as well as passive stretching  15 mins supine with passive stretching 25 mins supine and seated with passive stretching with occipital release  STM with manual stretching into rotation, lateral flexion to cervical paraspinals Very light STM with manual stretching due to having injection on Monday.   Repeat in sitting Repeat in sitting   Manual traction with towel       5 mins  Manual gr 3 to 3++      Functional Activities: HEP: General stretching and ROM in the neck was recommended daily. Pt was educated on TENS unit and was told to purchase more supportive pillow and to use a towel roll under neck until. Treatment/Session Assessment: Exercises were continued from last visit and pt is not having any increase in pain following exercise and massage. Scheduling FCE following next week. · Pain/ Symptoms: Initial:   0/10 Im feeling good today. \"  Post Session:  0/10  ·   Compliance with Program/Exercises: Will assess as treatment progresses. · Recommendations/Intent for next treatment session: \"Next visit will focus on advancements to more challenging activities\".   Total Treatment Duration:  PT Patient Time In/Time Out  Time In: 0930  Time Out: [address], PT, DPT

## 2017-09-27 ENCOUNTER — HOSPITAL ENCOUNTER (OUTPATIENT)
Dept: PHYSICAL THERAPY | Age: 52
Discharge: HOME | End: 2017-09-27
Payer: COMMERCIAL

## 2017-09-27 PROCEDURE — 97140 MANUAL THERAPY 1/> REGIONS: CPT

## 2017-09-27 PROCEDURE — 97014 ELECTRIC STIMULATION THERAPY: CPT

## 2017-09-27 NOTE — PROGRESS NOTES
Claire Lyons   [phone] [facility],[address]  [address] [place] [address].  [phone]   [phone]           PHYSICIAN COMMUNICATION    REFERRING PHYSICIAN: Andrez Cabot*  Return Physician Appointment: tbd  MEDICAL/REFERRING DIAGNOSIS:  · Migraines [G43.909]  · Neck pain [M54.2]  ATTENDANCE: Michael Dumont has attended 10 out of 17 visits, with 7 cancellation(s) and 0 no shows. ASSESSMENT:  DATE: 2017    PROGRESS: Claire Lyons has made progress with therapy and with getting injections but states that she is afraid that when the injection wears off the symptoms will return. Pt needs updated order for FCE. RECOMMENDATIONS: Please send updated order for patient to have Functional Capacity Exam. Thank you.      Thank you for this referral,  Marcelle Ruiz, PT, DPT     Referring Physician Signature: Andrez Cabot*          Date

## 2017-09-27 NOTE — PROGRESS NOTES
Claire Lyons  : 1965 [id number] [facility],[address]  [address] [place] [address]  [phone]   [phone]        OUTPATIENT PHYSICAL THERAPY:Daily Note 2017      ICD-10: Treatment Diagnosis: Cervicalgia (M54.2) , Headache (R51)  Precautions/Allergies:   Pcn [penicillins] and Shellfish derived   Fall Risk Score: 0 (? 5 = High Risk)  MD Orders: Eval and Treat  MEDICAL/REFERRING DIAGNOSIS:  Migraines [G43.909]  Neck pain [M54.2]   DATE OF ONSET: 2 years ago   REFERRING PHYSICIAN: Yani Dash*  RETURN PHYSICIAN APPOINTMENT: freddie      INITIAL ASSESSMENT:  Ms. Walker Nails presents to therapy with pain in the neck and shoulders as well as recurring chronic migraines that are debilitating. Pt is having trouble at times with daily activities due to the neck pain that causes the migraines to come on. Pt would benefit from skilled physical therapy for the above diagnosis to help with decreased tension, and to decrease the amount of episodes of migraines. PROBLEM LIST (Impacting functional limitations):  1. Decreased Strength  2. Decreased ADL/Functional Activities  3. Increased Pain  4. Decreased Activity Tolerance  5. Decreased Flexibility/Joint Mobility INTERVENTIONS PLANNED:  1. Cold  2. Electrical Stimulation  3. Heat  4. Home Exercise Program (HEP)  5. Manual Therapy  6. Range of Motion (ROM)  7. Therapeutic Exercise/Strengthening  8. Ultrasound (US)   TREATMENT PLAN:  Effective Dates: 17 TO 9-15-17. Frequency/Duration: 2 times a week for 7 weeks  GOALS: (Goals have been discussed and agreed upon with patient.)  Short-Term Functional Goals: Time Frame: 2 weeks   1. Ms. Walker Nails to be independent with HEP. 2. Pt able to tolerate maintaining proper posture in sitting without VCs and no increase in pain in the neck or shoulders for >30 mins. 3. Pt to improve cervical ROM without pain to improve driving safety.    Discharge Goals: Time Frame: 7 weeks   1. Pt able to tolerate daily functional activities without pain greater than 3/10 in the neck or shoulders. 2. Pt able to sleep throughout night without waking from pain in the head or neck. 3. Pt to report less debilitating migraines to less than 2 days. Rehabilitation Potential For Stated Goals: Fair                HISTORY:   History of Present Injury/Illness (Reason for Referral):  Pt 49 y/o F with pain in the neck and head with migraines that were debilitating causing her to go out of work on disability 2 years ago. She has been getting injections in her neck and has had therapy before 2 years ago. She is still seeing pain management for the injections. She is getting migraines about 1-2 times a week and is lasting 3-4 days at a time. She is currently out of work   Past Medical History/Comorbidities:   Ms. Kasi Gonzales  has a past medical history of Anxiety attack; Borderline personality disorder (10/8/2014); Chicken pox; Cystocele; Esophageal reflux (10/8/2014); Hypercholesteremia; Measles; Migraine with aura, without mention of intractable migraine without mention of status migrainosus (10/8/2014); Migraines; Morbid obesity ([facility] [address] Mumps; Ovarian cyst; Pneumonia; Rectocele; Stress incontinence (2007); Symptomatic menopausal or female climacteric states (10/8/2014); Unspecified essential hypertension (10/8/2014); Unspecified hypothyroidism; and Urge incontinence (2007). Ms. Kasi Gonzales  has a past surgical history that includes urological (2007); gastrectomy (02/06/12); hysterectomy (1998); salpingo-oophorectomy (2003); oophorectomy (2003); hysterectomy (2000); orthopaedic; and orthopaedic (04/2015).   Social History/Living Environment:    Lives alone   Prior Level of Function/Work/Activity:  Working toward long term disability   Dominant Side:         RIGHT  Other Clinical Tests:          xrays and MRIs have been done previously   Previous Treatment Approaches:          Therapy 2 years ago for neck pain   Personal Factors:          Past/Current Experience:  Migraines for the past 2 years   Current Medications:    Current Outpatient Prescriptions:     verapamil ER (VERELAN PM) 100 mg capsule, Take 1 Cap by mouth nightly., Disp: 90 Cap, Rfl: 1    ezetimibe-simvastatin (VYTORIN) 10-20 mg per tablet, Take 1 Tab by mouth nightly., Disp: 90 Tab, Rfl: 1    SYNTHROID 125 mcg tablet, Take 1 Tab by mouth Daily (before breakfast). GEMINI, Disp: 90 Tab, Rfl: 1    potassium chloride (KLOR-CON M20) 20 mEq tablet, Take 1 Tab by mouth two (2) times a day., Disp: 180 Tab, Rfl: 1    indapamide (LOZOL) 2.5 mg tablet, Take 1 Tab by mouth every morning., Disp: 90 Tab, Rfl: 1    colestipol (COLESTID) 1 gram tablet, Take 1 Tab by mouth two (2) times a day., Disp: 180 Tab, Rfl: 1    propranolol LA (INDERAL LA) 80 mg SR capsule, Take 1 Cap by mouth daily. , Disp: 90 Cap, Rfl: 1    LORazepam (ATIVAN) 1 mg tablet, 1-2 daily prn, Disp: 90 Tab, Rfl: 1    nortriptyline (PAMELOR) 25 mg capsule, 1 po QPM  Indications: NEUROPATHIC PAIN, Disp: 90 Cap, Rfl: 1    tiZANidine (ZANAFLEX) 2 mg tablet, Take 2 Tabs by mouth nightly as needed. Takes 1-2 tablets as needed at bedtime, Disp: 90 Tab, Rfl: 1    estradiol acetate (FEMRING) 0.05 mg/24 hr ring, Insert 1 Units into vagina every three (3) months., Disp: 1 Each, Rfl: 4    nystatin-triamcinolone (MYCOLOG II) topical cream, Apply  to affected area two (2) times a day., Disp: 30 g, Rfl: 3    butalbital-acetaminophen-caffeine (ESGIC PLUS) -40 mg per tablet, Take 1 Tab by mouth two (2) times a day. Indications: MIGRAINE, TENSION-TYPE HEADACHE, Disp: , Rfl:     SUMAtriptan Succinate (SUMAVEL DOSEPRO) 6 mg/0.5 mL nfIj, Inject SC at onset of migraine. Repeat dose after 2 hrs if needed. Max 2 injections/24 hrs. Indications: MIGRAINE, Disp: 18 Syringe, Rfl: 1    eletriptan (RELPAX) 40 mg tablet, 1 po at onset of migraine and repeat after 2 hrs. If needed.   Max dose 2 tabs/24 hrs., Disp: 18 Tab, Rfl: 1    HYDROcodone-acetaminophen (NORCO) 5-325 mg per tablet, Take 1 Tab by mouth every four (4) hours as needed for Pain., Disp: , Rfl:     Diclofenac Potassium (CAMBIA) 50 mg pwpk, Mix 1 packet in 1-2 oz of water and drink at onset of migraine (max: 1 packet/day)  Indications: MIGRAINE, Disp: 9 Packet, Rfl: 11    triamcinolone (NASACORT AQ) 55 mcg nasal inhaler, , Disp: , Rfl: 1    promethazine (PHENERGAN) 25 mg tablet, Take 1 Tab by mouth every eight (8) hours as needed for Nausea., Disp: 30 Tab, Rfl: 2    multivitamin (ONE A DAY) tablet, Take 1 Tab by mouth daily. Last dose 4 days ago, Disp: , Rfl:     aspirin 81 mg tablet, Take 81 mg by mouth daily. Last dose 5 days ago, Disp: , Rfl:     vitamin e (E GEMS) 1,000 unit capsule, Take 1,000 Units by mouth daily. Last dose 4 days ago, Disp: , Rfl:     calcium citrate-vitamin d3 (CITRACAL + D) 315-200 mg-unit Tab, Take 1 Tab by mouth daily (with breakfast). , Disp: , Rfl:    Date Last Reviewed:  9/27/2017   EXAMINATION:   Observation/Orthostatic Postural Assessment:          Rounded shoulders, forward head (not significant)  Palpation:          Tenderness over the T1 region and tension in shoulders   ROM:     Cervical       Flexion: decreased ROM with pain       Extension: WFL      R rotation: slightly limited with pain at end range      L rotation: slightly limited with pain at end range       R SB: limited and slightly painful       L SB: limited and slightly painful    Shoulder   Strength:     Cervical       Flexion: 4/5      Extension: 4-/5      R SB: 4/5      L SB: 4/5      R rotation:4/5      L rotation: 4/5   Shoulder    Special Tests:          Compression (-), Spurlings (-), Distraction (+)  Neurological Screen:        Sensation: no compliant of numbness or tingling   Functional Mobility:         Independent unless with migraine   Balance:          Good    Body Structures Involved:  1.  Muscles Body Functions Affected:  1. Mental  2. Sensory/Pain  3. Neuromusculoskeletal  4. Movement Related Activities and Participation Affected:  1. General Tasks and Demands  2. Mobility  3. Community, Social and Civic Life   CLINICAL PRESENTATION:   CLINICAL DECISION MAKING:   Outcome Measure: Tool Used: Neck Disability Index (NDI)  Score:  Initial: 29/50  Most Recent: X/50 (Date: -- )   Interpretation of Score: The Neck Disability Index is a revised form of the Oswestry Low Back Pain Index and is designed to measure the activities of daily living in person's with neck pain. Each section is scored on a 0-5 scale, 5 representing the greatest disability. The scores of each section are added together for a total score of 50. Score 0 1-10 11-20 21-30 31-40 41-49 50   Modifier CH CI CJ CK CL CM CN     Medical Necessity:   · Patient is expected to demonstrate progress in strength and range of motion to increase independence with functional activities daily with less pain. .  Reason for Services/Other Comments:  · Patient continues to require present interventions due to patient's inability to perform functional activities without neck pain. Franc Werner TREATMENT:   (In addition to Assessment/Re-Assessment sessions the following treatments were rendered)  THERAPEUTIC EXERCISE: (see below for minutes):  Exercises per grid below to improve mobility and strength. Required minimal verbal cues to promote proper body alignment. Progressed resistance, range and repetitions as indicated. MANUAL THERAPY: (see below for minutes): Joint mobilization, Soft tissue mobilization and Manipulation was utilized and necessary because of the patient's restricted joint motion, painful spasm and restricted motion of soft tissue.    MODALITIES: (see below for minutes):      see chart below    MECHANICAL TRACTION: (see below for minutes): Traction was used due to the patient's neck pain and migraine headaches in order to relieve pain in or originating from his spine.    Date: 7-25-17 08/02/17 (visit 2) 8-9-17  (Visit 3)  8-16-17  (visit 4)  8-18-17  (Visit 5)  8-23-17  (Visit 6) 09/08/17 (visit 7) 9-13-17  (Visit 8)  9-20-17  (visit 9) 9-22-17  (Visit 10)  9-27-17 (visit 11)   Modalities:  30  min 30 mins  15 mins  15 mins  15 mins  15 min 15 mins  15 mins 15 mins  10 min   MHP with IFC  15 min at beginning of treatment 15 mins (8:45-9) 15 mins  15 mins  15 mins  15 min 15 mins prior to tx  15 mins prior to tx  15 mins prior to tx  10 min prior to tx, supine   Traction  15 min up to 14# pull for muscular stretching at end of session 15 mins to 16# on 2nd level (9:15-9:30)  (Discontinued)                        Therapeutic Exercise: 15 mins  25 min   15 mins     10 mins 10 mins     Upper trap stretch  x15SH  [id number] B manuall   Repeat  Manual  Manual Manual  Manual  Manual     Levator stretch  x15SH  [id number] B manually    Manual  manual Manual  Manual  Manual     Rotation stretch  5SH bilateral  [id number] B manually    Manual  manual Manual  Manual  Manual     Chin tuck  [id number]   Standing x20   Supine x 20          Horizontal abduction with tband          3x10  Repeat     Alternating isometrics  Into cervical rotation [id number]            Doorway stretch      4x10SH    Repeat  Repeat     Proprioceptive Activities:                                                        Manual Therapy:  5 min 15 mins  30 mins  15 mins  30 mins  30 min 25 mins  20 mins  20 mins  30 min   Soft tissue mobilization  To cervical paraspinals x 5 min 15 mins to extensors and upper traps  30 mins to extensors and upper traps as well as passive stretching  15 mins supine with passive stretching 25 mins supine and seated with passive stretching with occipital release  STM with manual stretching into rotation, lateral flexion to cervical paraspinals Very light STM with manual stretching due to having injection on Monday.   Repeat in sitting Repeat in sitting STM and IASTM to B upper traps, seated   Manual traction with towel       5 mins  Manual gr 3 to 3++    10 min cervical distraction   Functional Activities:                                                                      HEP: General stretching and ROM in the neck was recommended daily. Pt was educated on TENS unit and was told to purchase more supportive pillow and to use a towel roll under neck until. Treatment/Session Assessment: Pt was 5 min late today. Pt reported relief after manual therapy. Continue POC. · Pain/ Symptoms: Initial: 3/10 neck and upper trapezius    Pt states \"I had a migraine this morning. The doctor gave me a neck collar to wear twice a day for pain relief. \" Post Session:  1/10 ·   Compliance with Program/Exercises: Will assess as treatment progresses. · Recommendations/Intent for next treatment session: \"Next visit will focus on advancements to more challenging activities\".   Total Treatment Duration:  PT Patient Time In/Time Out  Time In: 0935  Time Out: 2416 [facility]

## 2017-09-29 ENCOUNTER — HOSPITAL ENCOUNTER (OUTPATIENT)
Dept: PHYSICAL THERAPY | Age: 52
Discharge: HOME | End: 2017-09-29
Payer: COMMERCIAL

## 2017-10-05 ENCOUNTER — HOSPITAL ENCOUNTER (OUTPATIENT)
Dept: PHYSICAL THERAPY | Age: 52
Discharge: HOME | End: 2017-10-05
Payer: COMMERCIAL

## 2017-10-05 PROCEDURE — 97750 PHYSICAL PERFORMANCE TEST: CPT

## 2017-10-05 NOTE — PROGRESS NOTES
Claire Lyons  : 1965  Payor: Claudeen Lao / Plan: [facility] / Product Type: PPO /  [address]  at [facility]  [address], [address], [place] [address]  [phone]   [phone]        Outpatient PHYSICAL THERAPY: Functional Capacity Evaluation  ICD-10: Treatment Diagnosis: Cervicalgia (M54.2)  REFERRING PHYSICIAN: TA Dudley MD Orders: FCE  Return Physician Appointment: not set  MEDICAL/REFERRING DIAGNOSIS: Cervicalgia   DATE OF ONSET: Chronic, 2015   PRIOR LEVEL OF FUNCTION: Independent   PRECAUTIONS/ALLERGIES: PCN  ASSESSMENT:  ?????? ? ? This section established at most recent assessment??????????  PURPOSE OF ASSESSMENT  Ms. 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 Ms. David Dietz has been referred with the diagnosis of Cervicalgia. She was present for evaluation on 10/5/2017 for a period of 2 hours and 0 minutes. RELIABILITY AND CONSISTENCY OF EFFORT  The results of this evaluation suggest that Ms. Lyons gave an unreliable effort, with 34 consistency measures yielding a reliability score of 36 out of 74 (49%). FUNCTIONAL ABILITIES  Client demonstrated sufficient strength to perform the following activities: In the Light category: Carrying. In the Sedentary category: High Lift, Mid Lift, Low Lift, Full Lift, Pushing, Pulling, Overall Strength. Client is able to perform the following activities: On a Constant basis: Sitting. On a Frequent basis: Walking, [employer]. On an Occasional basis: Standing, Balance, Bending, Reach Immediate (Front)  Right, Reach Immediate (Front)  Left. FUNCTIONAL LIMITATIONS  Client did not demonstrate the ability to perform the following activities: Reach Overhead [employer])  Right, Reach Overhead (Front)  Left.       Deficits were observed in the following range of motion tests: Cervical Flexion, Cervical Extension, Cervical Lateral Flexion Left, Cervical Lateral Flexion Right, Cervical Rotation Left, Cervical Rotation Right, Shoulder Abduction Left, Shoulder Abduction Right, Shoulder Flexion Left, Shoulder Flexion Right, Shoulder Extension Left, Shoulder Extension Right. Unless otherwise noted, range of motion deficits do not indicate the presence of a functional limitation. CONCLUSIONS  Overall, Ms. Dov Keller was pleasant throughout today's FCE testing; however, her reliability scores were very low. She demonstrated weakness throughout today's static strength testing, and different test positions did not seem to vary her strength scores. I would expect greater variance depending on the test position, however they were all consistently very low. Visibly, she did not seem to be exerting maximal effort and this may have been a factor in her very low reliability. She was 'Sedentary' in nearly all test parameters. Pain remained consistent at 6-8/10 throughout all tests. According to [facility] results, all occasional/recommended lifts are 5 lbs or less in the tested positions. PLAN OF CARE:  No further physical therapy follow-up is planned as orders were for FCE testing only. Regarding Claire Lyons's therapy, I certify that the treatment plan above will be carried out by a therapist or under their direction. Thank you for this referral,  Geneva Castaneda, PT, DPT                   SUBJECTIVE:  Please see [facility] test results  OBJECTIVE:  Please see [facility] test results  TREATMENT:    (In addition to Assessment/Re-Assessment sessions the following treatments were rendered)  FCE ( 150 min.) Patient completes FCE testing with the [facility] system.  Test report will be scanned into medical record.  ______________________________________________________________________________________________________  Total Treatment Duration:  PT Patient Time In/Time Out  Time In: 0815  Time Out: [address] [place], [place], DPT

## 2018-02-21 PROBLEM — G43.111 INTRACTABLE MIGRAINE WITH AURA WITH STATUS MIGRAINOSUS: Status: ACTIVE | Noted: 2017-03-15

## 2018-02-28 PROBLEM — G43.109 MIGRAINE WITH AURA AND WITHOUT STATUS MIGRAINOSUS, NOT INTRACTABLE: Status: ACTIVE | Noted: 2017-03-15

## 2018-02-28 PROBLEM — M25.552 HIP PAIN, LEFT: Status: ACTIVE | Noted: 2018-02-28

## 2018-02-28 PROBLEM — T39.95XA ANALGESIC REBOUND HEADACHE: Status: RESOLVED | Noted: 2017-03-15 | Resolved: 2018-02-28

## 2018-02-28 PROBLEM — G44.40 ANALGESIC REBOUND HEADACHE: Status: RESOLVED | Noted: 2017-03-15 | Resolved: 2018-02-28

## 2018-10-11 ENCOUNTER — HOSPITAL ENCOUNTER (OUTPATIENT)
Dept: MAMMOGRAPHY | Age: 53
Discharge: HOME | End: 2018-10-11
Attending: OBSTETRICS & GYNECOLOGY
Payer: COMMERCIAL

## 2018-10-11 DIAGNOSIS — Z12.31 VISIT FOR SCREENING MAMMOGRAM: ICD-10-CM

## 2018-10-11 PROCEDURE — 77063 BREAST TOMOSYNTHESIS BI: CPT

## 2019-10-28 PROBLEM — Z82.49 FAMILY HISTORY OF CARDIOVASCULAR DISEASE: Status: ACTIVE | Noted: 2019-10-28

## 2019-11-07 ENCOUNTER — HOSPITAL ENCOUNTER (OUTPATIENT)
Dept: CT IMAGING | Age: 54
Discharge: HOME | End: 2019-11-07
Attending: INTERNAL MEDICINE
Payer: SELF-PAY

## 2019-11-07 DIAGNOSIS — Z82.49 FAMILY HISTORY OF CARDIOVASCULAR DISEASE: ICD-10-CM

## 2019-11-07 PROCEDURE — 75571 CT HRT W/O DYE W/CA TEST: CPT

## 2019-11-22 ENCOUNTER — HOSPITAL ENCOUNTER (OUTPATIENT)
Dept: MAMMOGRAPHY | Age: 54
Discharge: HOME | End: 2019-11-22
Attending: OBSTETRICS & GYNECOLOGY
Payer: COMMERCIAL

## 2019-11-22 DIAGNOSIS — Z12.31 VISIT FOR SCREENING MAMMOGRAM: ICD-10-CM

## 2019-11-22 PROCEDURE — 77063 BREAST TOMOSYNTHESIS BI: CPT

## 2020-02-13 PROBLEM — K57.30 DIVERTICULOSIS OF LARGE INTESTINE WITHOUT PERFORATION OR ABSCESS WITHOUT BLEEDING: Status: ACTIVE | Noted: 2020-02-13

## 2020-02-13 PROBLEM — Q27.33 ARTERIOVENOUS MALFORMATION OF DIGESTIVE SYSTEM VESSEL (CODE): Status: ACTIVE | Noted: 2020-02-13

## 2020-03-18 PROBLEM — Z79.899 ENCOUNTER FOR MEDICATION MANAGEMENT: Status: ACTIVE | Noted: 2020-03-18

## 2020-11-23 ENCOUNTER — HOSPITAL ENCOUNTER (OUTPATIENT)
Dept: MAMMOGRAPHY | Age: 55
Discharge: HOME | End: 2020-11-23
Attending: OBSTETRICS & GYNECOLOGY
Payer: COMMERCIAL

## 2020-11-23 DIAGNOSIS — Z12.39 SCREENING FOR BREAST CANCER: ICD-10-CM

## 2020-11-23 PROCEDURE — 77063 BREAST TOMOSYNTHESIS BI: CPT

## 2021-11-01 ENCOUNTER — TRANSCRIBE ORDER (OUTPATIENT)
Dept: SCHEDULING | Age: 56
End: 2021-11-01

## 2021-11-01 DIAGNOSIS — Z12.31 SCREENING MAMMOGRAM FOR HIGH-RISK PATIENT: Primary | ICD-10-CM

## 2021-11-12 PROBLEM — I25.119 CORONARY ARTERY DISEASE INVOLVING NATIVE CORONARY ARTERY OF NATIVE HEART WITH ANGINA PECTORIS (HCC): Status: ACTIVE | Noted: 2021-11-12

## 2021-11-27 ENCOUNTER — HOSPITAL ENCOUNTER (OUTPATIENT)
Dept: MAMMOGRAPHY | Age: 56
Discharge: HOME | End: 2021-11-27
Attending: OBSTETRICS & GYNECOLOGY
Payer: COMMERCIAL

## 2021-11-27 DIAGNOSIS — Z12.31 SCREENING MAMMOGRAM FOR HIGH-RISK PATIENT: ICD-10-CM

## 2021-11-27 PROCEDURE — 77063 BREAST TOMOSYNTHESIS BI: CPT

## 2022-01-10 PROBLEM — I25.10 CORONARY ARTERY DISEASE INVOLVING NATIVE CORONARY ARTERY OF NATIVE HEART WITHOUT ANGINA PECTORIS: Status: ACTIVE | Noted: 2021-11-12

## 2022-02-08 PROBLEM — I25.119 CORONARY ARTERY DISEASE INVOLVING NATIVE CORONARY ARTERY OF NATIVE HEART WITH ANGINA PECTORIS (HCC): Status: ACTIVE | Noted: 2022-02-08

## 2022-03-18 PROBLEM — I25.119 CORONARY ARTERY DISEASE INVOLVING NATIVE CORONARY ARTERY OF NATIVE HEART WITH ANGINA PECTORIS (HCC): Status: ACTIVE | Noted: 2022-02-08

## 2022-03-18 PROBLEM — I25.10 CORONARY ARTERY DISEASE INVOLVING NATIVE CORONARY ARTERY OF NATIVE HEART WITHOUT ANGINA PECTORIS: Status: ACTIVE | Noted: 2021-11-12

## 2022-03-19 PROBLEM — M25.552 HIP PAIN, LEFT: Status: ACTIVE | Noted: 2018-02-28

## 2022-03-19 PROBLEM — Z82.49 FAMILY HISTORY OF CARDIOVASCULAR DISEASE: Status: ACTIVE | Noted: 2019-10-28

## 2022-03-20 PROBLEM — K57.30 DIVERTICULOSIS OF LARGE INTESTINE WITHOUT PERFORATION OR ABSCESS WITHOUT BLEEDING: Status: ACTIVE | Noted: 2020-02-13

## 2022-03-20 PROBLEM — G43.109 MIGRAINE WITH AURA AND WITHOUT STATUS MIGRAINOSUS, NOT INTRACTABLE: Status: ACTIVE | Noted: 2017-03-15

## 2022-04-20 PROBLEM — Z79.899 ENCOUNTER FOR MEDICATION MANAGEMENT: Status: ACTIVE | Noted: 2022-04-20

## 2022-07-22 DIAGNOSIS — I10 ESSENTIAL HYPERTENSION: Primary | ICD-10-CM

## 2022-07-22 DIAGNOSIS — E03.9 HYPOTHYROIDISM, ADULT: ICD-10-CM

## 2022-07-22 DIAGNOSIS — E78.5 DYSLIPIDEMIA: ICD-10-CM

## 2022-08-03 ENCOUNTER — NURSE ONLY (OUTPATIENT)
Dept: FAMILY MEDICINE CLINIC | Facility: CLINIC | Age: 57
End: 2022-08-03

## 2022-08-03 DIAGNOSIS — E03.9 HYPOTHYROIDISM, ADULT: ICD-10-CM

## 2022-08-03 DIAGNOSIS — I10 ESSENTIAL HYPERTENSION: ICD-10-CM

## 2022-08-03 LAB
ANION GAP SERPL CALC-SCNC: 6 MMOL/L (ref 7–16)
BUN SERPL-MCNC: 25 MG/DL (ref 6–23)
CALCIUM SERPL-MCNC: 9.7 MG/DL (ref 8.3–10.4)
CHLORIDE SERPL-SCNC: 107 MMOL/L (ref 98–107)
CO2 SERPL-SCNC: 27 MMOL/L (ref 21–32)
CREAT SERPL-MCNC: 0.9 MG/DL (ref 0.6–1)
GLUCOSE SERPL-MCNC: 86 MG/DL (ref 65–100)
POTASSIUM SERPL-SCNC: 4.6 MMOL/L (ref 3.5–5.1)
SODIUM SERPL-SCNC: 140 MMOL/L (ref 136–145)
TSH, 3RD GENERATION: 2.92 UIU/ML (ref 0.36–3.74)

## 2022-08-09 NOTE — PROGRESS NOTES
Yashira Rand (: 1965) is a 56 y.o. female, an established patient, is here for evaluation of the following chief complaint(s):  Chief Complaint   Patient presents with    Hypertension    Results     Lab results            ASSESSMENT/PLAN:  Oskar Webb was seen today for hypertension and results. Diagnoses and all orders for this visit:    Essential hypertension  -     lisinopril-hydroCHLOROthiazide (PRINZIDE;ZESTORETIC) 10-12.5 MG per tablet; Take 1 tablet by mouth in the morning.  -     Comprehensive Metabolic Panel; Future    Dyslipidemia  -     Comprehensive Metabolic Panel; Future  -     Lipid Panel; Future    Hypothyroidism, adult  -     levothyroxine (SYNTHROID) 125 MCG tablet; Take 1 tablet by mouth every morning (before breakfast)    Spinal stenosis of cervical region    Migraine with aura and without status migrainosus, not intractable  -     Erenumab-aooe (AIMOVIG) 140 MG/ML SOAJ; Inject 140 mg into the skin every 30 days    Anxiety    Cervicalgia  -     tiZANidine (ZANAFLEX) 4 MG tablet; Takes 1 tablet at bedtime    Nausea  -     promethazine (PHENERGAN) 25 MG tablet; Take 1 tablet by mouth every 8 hours as needed for Nausea    Tinea corporis  -     nystatin-triamcinolone (MYCOLOG II) 080517-4.1 UNIT/GM-% cream; Apply topically 2 times daily as needed (tinea rash/itching)    Screening for HIV (human immunodeficiency virus)  -     HIV 1/2 Ag/Ab, 4TH Generation,W Rflx Confirm; Future    I reviewed recent lab results w/  Ms. Rand. TSH is treated to goal.  Continue levothyroxine 125 mcg daily. BP is normal.   Prescribed: Indapamide 2.5 mg daily + zestoretic 10/12.5 mg daily. She reports getting normal BP readings when checking daily at home. Continue meds as before. Continue Vytorin 10/20mg daily for mgt of HLD. She's been approved for disability and now has insurance through [employer] as secondary.   States that a PA may be needed to continue some of her meds for tx of migraine. Her neurologist is prescibing Nurtec & Zonisamide, but we have been prescribing Aimovig. She continues Lorazepam 1 mg 1-2 daily prn. States that at this time, she typically only needs to take 2-3 times each month. she  did not bring her  medications with her to today's visit and understands that we need these to help with reconciliation of her  medications with her  chart record. I explained to her  the increased risk of medication errors being made when we do not have her  bottles to review at each visit. Follow Up    Return for 6 mos HTN/anxiety mgt w/ labs prior to visit. SUBJECTIVE/OBJECTIVE:  Htn Mgt- Has been getting good BP reading with average 123/82. Denies CP,  SOB . Still has dizziness and migraines when neck injections wear off after 7-8 weeks. Vitals:    08/10/22 0759   BP: 120/80   Pulse: 76   Resp: 16   Temp: 99.1 °F (37.3 °C)   TempSrc: Oral   SpO2: 98%   Weight: 202 lb 12.8 oz (92 kg)   Height: 5' 3\" (1.6 m)                    Orders Placed This Encounter    HIV 1/2 Ag/Ab, 4TH Generation,W Rflx Confirm     Standing Status:   Future     Standing Expiration Date:   8/10/2023    Comprehensive Metabolic Panel     Standing Status:   Future     Standing Expiration Date:   8/10/2023    Lipid Panel     Standing Status:   Future     Standing Expiration Date:   8/10/2023    ASPIRIN 81 PO     Sig: Take 81 mg by mouth daily    Multiple Vitamins-Minerals (THERAPEUTIC MULTIVITAMIN-MINERALS) tablet     Sig: Take 1 tablet by mouth in the morning. Erenumab-aooe (AIMOVIG) 140 MG/ML SOAJ     Sig: Inject 140 mg into the skin every 30 days     Dispense:  3 pen     Refill:  3    levothyroxine (SYNTHROID) 125 MCG tablet     Sig: Take 1 tablet by mouth every morning (before breakfast)     Dispense:  90 tablet     Refill:  3    lisinopril-hydroCHLOROthiazide (PRINZIDE;ZESTORETIC) 10-12.5 MG per tablet     Sig: Take 1 tablet by mouth in the morning.      Dispense:  90 tablet Refill:  3    nystatin-triamcinolone (MYCOLOG II) 650120-0.1 UNIT/GM-% cream     Sig: Apply topically 2 times daily as needed (tinea rash/itching)     Dispense:  30 g     Refill:  5    promethazine (PHENERGAN) 25 MG tablet     Sig: Take 1 tablet by mouth every 8 hours as needed for Nausea     Dispense:  90 tablet     Refill:  1    tiZANidine (ZANAFLEX) 4 MG tablet     Sig: Takes 1 tablet at bedtime     Dispense:  90 tablet     Refill:  1               An electronic signature was used to authenticate this note.   -- Liliane Matias PA

## 2022-08-10 ENCOUNTER — OFFICE VISIT (OUTPATIENT)
Dept: FAMILY MEDICINE CLINIC | Facility: CLINIC | Age: 57
End: 2022-08-10
Payer: COMMERCIAL

## 2022-08-10 VITALS
OXYGEN SATURATION: 98 % | TEMPERATURE: 99.1 F | HEART RATE: 76 BPM | DIASTOLIC BLOOD PRESSURE: 80 MMHG | SYSTOLIC BLOOD PRESSURE: 120 MMHG | HEIGHT: 63 IN | BODY MASS INDEX: 35.93 KG/M2 | RESPIRATION RATE: 16 BRPM | WEIGHT: 202.8 LBS

## 2022-08-10 DIAGNOSIS — R11.0 NAUSEA: ICD-10-CM

## 2022-08-10 DIAGNOSIS — E78.5 DYSLIPIDEMIA: ICD-10-CM

## 2022-08-10 DIAGNOSIS — Z11.4 SCREENING FOR HIV (HUMAN IMMUNODEFICIENCY VIRUS): ICD-10-CM

## 2022-08-10 DIAGNOSIS — G43.109 MIGRAINE WITH AURA AND WITHOUT STATUS MIGRAINOSUS, NOT INTRACTABLE: ICD-10-CM

## 2022-08-10 DIAGNOSIS — B35.4 TINEA CORPORIS: ICD-10-CM

## 2022-08-10 DIAGNOSIS — M54.2 CERVICALGIA: ICD-10-CM

## 2022-08-10 DIAGNOSIS — E03.9 HYPOTHYROIDISM, ADULT: ICD-10-CM

## 2022-08-10 DIAGNOSIS — F41.9 ANXIETY: ICD-10-CM

## 2022-08-10 DIAGNOSIS — M48.02 SPINAL STENOSIS OF CERVICAL REGION: ICD-10-CM

## 2022-08-10 DIAGNOSIS — I10 ESSENTIAL HYPERTENSION: Primary | ICD-10-CM

## 2022-08-10 PROCEDURE — 99214 OFFICE O/P EST MOD 30 MIN: CPT | Performed by: PHYSICIAN ASSISTANT

## 2022-08-10 RX ORDER — LISINOPRIL AND HYDROCHLOROTHIAZIDE 12.5; 1 MG/1; MG/1
1 TABLET ORAL DAILY
Qty: 90 TABLET | Refills: 3 | Status: SHIPPED | OUTPATIENT
Start: 2022-08-10

## 2022-08-10 RX ORDER — PROMETHAZINE HYDROCHLORIDE 25 MG/1
25 TABLET ORAL EVERY 8 HOURS PRN
Qty: 90 TABLET | Refills: 1 | Status: SHIPPED | OUTPATIENT
Start: 2022-08-10

## 2022-08-10 RX ORDER — TIZANIDINE 4 MG/1
TABLET ORAL
Qty: 90 TABLET | Refills: 1 | Status: SHIPPED | OUTPATIENT
Start: 2022-08-10

## 2022-08-10 RX ORDER — LEVOTHYROXINE SODIUM 0.12 MG/1
125 TABLET ORAL
Qty: 90 TABLET | Refills: 3 | Status: SHIPPED | OUTPATIENT
Start: 2022-08-10

## 2022-08-10 RX ORDER — ERENUMAB-AOOE 140 MG/ML
140 INJECTION, SOLUTION SUBCUTANEOUS
Qty: 3 PEN | Refills: 3 | Status: SHIPPED | OUTPATIENT
Start: 2022-08-10

## 2022-08-10 RX ORDER — M-VIT,TX,IRON,MINS/CALC/FOLIC 27MG-0.4MG
1 TABLET ORAL DAILY
COMMUNITY

## 2022-08-10 ASSESSMENT — ANXIETY QUESTIONNAIRES
2. NOT BEING ABLE TO STOP OR CONTROL WORRYING: 0
4. TROUBLE RELAXING: 0
GAD7 TOTAL SCORE: 0
5. BEING SO RESTLESS THAT IT IS HARD TO SIT STILL: 0
6. BECOMING EASILY ANNOYED OR IRRITABLE: 0
1. FEELING NERVOUS, ANXIOUS, OR ON EDGE: 0
7. FEELING AFRAID AS IF SOMETHING AWFUL MIGHT HAPPEN: 0
3. WORRYING TOO MUCH ABOUT DIFFERENT THINGS: 0

## 2022-08-10 ASSESSMENT — PATIENT HEALTH QUESTIONNAIRE - PHQ9
SUM OF ALL RESPONSES TO PHQ9 QUESTIONS 1 & 2: 0
1. LITTLE INTEREST OR PLEASURE IN DOING THINGS: 0
SUM OF ALL RESPONSES TO PHQ QUESTIONS 1-9: 0
2. FEELING DOWN, DEPRESSED OR HOPELESS: 0
SUM OF ALL RESPONSES TO PHQ QUESTIONS 1-9: 0

## 2022-08-15 ENCOUNTER — TELEPHONE (OUTPATIENT)
Dept: FAMILY MEDICINE CLINIC | Facility: CLINIC | Age: 57
End: 2022-08-15

## 2022-08-22 ENCOUNTER — TELEPHONE (OUTPATIENT)
Dept: FAMILY MEDICINE CLINIC | Facility: CLINIC | Age: 57
End: 2022-08-22

## 2022-08-22 DIAGNOSIS — B35.4 TINEA CORPORIS: Primary | ICD-10-CM

## 2022-08-22 RX ORDER — NYSTATIN 100000 U/G
CREAM TOPICAL
Qty: 30 G | Refills: 5 | Status: SHIPPED | OUTPATIENT
Start: 2022-08-22

## 2022-08-22 NOTE — TELEPHONE ENCOUNTER
Her insurance will not cover the nystatin/steroid cream combo. I will resend her rx for plain Nystatin. Please let pt know.

## 2022-08-24 ENCOUNTER — TELEPHONE (OUTPATIENT)
Dept: FAMILY MEDICINE CLINIC | Facility: CLINIC | Age: 57
End: 2022-08-24

## 2022-08-24 NOTE — TELEPHONE ENCOUNTER
Yashira with [facility] speciality pharmacy called to inform that PA for [place] Ferrariw was denied.  Please call [phone] to appeal

## 2022-11-28 ENCOUNTER — HOSPITAL ENCOUNTER (OUTPATIENT)
Dept: MAMMOGRAPHY | Age: 57
Discharge: HOME | End: 2022-12-01
Payer: MEDICARE

## 2022-11-28 DIAGNOSIS — Z12.31 VISIT FOR SCREENING MAMMOGRAM: ICD-10-CM

## 2022-11-28 PROCEDURE — 77063 BREAST TOMOSYNTHESIS BI: CPT

## 2022-12-08 ENCOUNTER — OFFICE VISIT (OUTPATIENT)
Dept: OBGYN CLINIC | Age: 57
End: 2022-12-08
Payer: MEDICARE

## 2022-12-08 VITALS — HEIGHT: 63 IN | BODY MASS INDEX: 36.68 KG/M2 | WEIGHT: 207 LBS

## 2022-12-08 DIAGNOSIS — Z12.11 ENCOUNTER FOR SCREENING FECAL OCCULT BLOOD TESTING: ICD-10-CM

## 2022-12-08 DIAGNOSIS — Z12.31 ENCOUNTER FOR SCREENING MAMMOGRAM FOR MALIGNANT NEOPLASM OF BREAST: ICD-10-CM

## 2022-12-08 DIAGNOSIS — Z01.419 WELL WOMAN EXAM WITH ROUTINE GYNECOLOGICAL EXAM: Primary | ICD-10-CM

## 2022-12-08 LAB
OCCULT BLOOD, OTHER: NEGATIVE
VALID INTERNAL CONTROL, POC: YES

## 2022-12-08 PROCEDURE — G0328 FECAL BLOOD SCRN IMMUNOASSAY: HCPCS | Performed by: OBSTETRICS & GYNECOLOGY

## 2022-12-08 PROCEDURE — 99396 PREV VISIT EST AGE 40-64: CPT | Performed by: OBSTETRICS & GYNECOLOGY

## 2022-12-08 ASSESSMENT — ENCOUNTER SYMPTOMS
SHORTNESS OF BREATH: 0
RESPIRATORY NEGATIVE: 1
GASTROINTESTINAL NEGATIVE: 1
COUGH: 0
ABDOMINAL PAIN: 0
EYES NEGATIVE: 1
BLOOD IN STOOL: 0
ALLERGIC/IMMUNOLOGIC NEGATIVE: 1

## 2022-12-08 NOTE — PROGRESS NOTES
Nate Jara is 56 y.o. female, , who presents today for a routine annual gynecological examination. No LMP recorded. Patient has had a hysterectomy. . Doing well. No Gyn, Breast, G/U, or GI complaints. Mammogram/Pap Hx 2022   Mammogram Date 2022   Mammogram Result WNL   Pap Date 3/18/2017   Pap Result WNL     GYN Intake Questionnaire 2022   Current Form of Contraception Hysterectomy   Previous Form of Contraception None   Menarche 14   Period Cycle Post Menopausal   Dyspareunia None   Post-Coital Bleeding None   Date of Mammogram 2022   Result of Mammogram WNL   Date of Pap 3/18/2017   Pap result WNL       Contraception:  hysterectomy  Has received Gardisil: UNKNOWN  Last Ruby 5 years ago - negative  Last time cholesterol was checked: 1 years ago    OB History:   OB History    Para Term  AB Living   0 0 0 0 0 0   SAB IAB Ectopic Molar Multiple Live Births   0 0 0 0 0 0         Health Maintenance Due   Topic Date Due    HIV screen  Never done    Annual Wellness Visit (AWV)  2022         GYN History            Yashira  has a past medical history of History of chicken pox, History of cystocele, History of measles, History of mumps, and Symptomatic menopausal or female climacteric states. .    Her surgeries include  has a past surgical history that includes Salpingo-oophorectomy (); Rectocele repair; bladder suspension; Urological Surgery (); gastrectomy (12); Hysterectomy, total abdominal (); Cystocele repair; orthopedic surgery; orthopedic surgery (2015); and Refractive surgery (Bilateral). .    Allergies   Allergen Reactions    Penicillins Anaphylaxis        Her current meds are:   Current Outpatient Medications   Medication Sig    nystatin (MYCOSTATIN) 218176 UNIT/GM cream Apply topically 2 times daily.     ASPIRIN 81 PO Take 81 mg by mouth daily    Multiple Vitamins-Minerals (THERAPEUTIC MULTIVITAMIN-MINERALS) tablet Take 1 tablet by mouth in the morning. Erenumab-aooe (AIMOVIG) 140 MG/ML SOAJ Inject 140 mg into the skin every 30 days    levothyroxine (SYNTHROID) 125 MCG tablet Take 1 tablet by mouth every morning (before breakfast)    lisinopril-hydroCHLOROthiazide (PRINZIDE;ZESTORETIC) 10-12.5 MG per tablet Take 1 tablet by mouth in the morning. promethazine (PHENERGAN) 25 MG tablet Take 1 tablet by mouth every 8 hours as needed for Nausea    tiZANidine (ZANAFLEX) 4 MG tablet Takes 1 tablet at bedtime    TURMERIC PO Take by mouth    calcium citrate-vitamin D (CITRACAL+D) 315-200 MG-UNIT per tablet Take 1 tablet by mouth daily (with breakfast)    colestipol (COLESTID) 1 g tablet Take 1 g by mouth 2 times daily    ezetimibe-simvastatin (VYTORIN) 10-20 MG per tablet Take 1 tablet by mouth    Flaxseed, Linseed, (FLAX SEED OIL) 1000 MG CAPS qd    indapamide (LOZOL) 2.5 MG tablet Take 2.5 mg by mouth    LORazepam (ATIVAN) 1 MG tablet Take 1 mg by mouth daily as needed. (Usually needs 2-3x/month)    potassium chloride (KLOR-CON M) 20 MEQ extended release tablet Take 20 mEq by mouth 2 times daily    Rimegepant Sulfate 75 MG TBDP Take 75 mg by mouth Twice a Week    Sod Fluoride-Potassium Nitrate 1.1-5 % PSTE USE TWICE DAILY IN PLACE OF REGULAR TOOTHPASTE. BRUSH THOROUGHLY FOR 2 MINUTES SPIT    triamcinolone (NASACORT) 55 MCG/ACT nasal inhaler as needed    vitamin E 1000 units capsule Take 1,000 Units by mouth daily    zonisamide (ZONEGRAN) 50 MG capsule TAKE 1 CAPSULE BY MOUTH TWICE DAILY     No current facility-administered medications for this visit. Family history is significant for family history includes Elevated Lipids in her father and mother; Heart Disease in her father, maternal grandmother, and paternal grandmother; Hypertension in her father, maternal grandmother, mother, and paternal grandmother;  Other in an other family member; Ovarian Cancer (age of onset: 28) in her maternal aunt; Sleep Apnea in her father and mother; Stroke in her maternal grandmother. .    Sarah Bingham  reports that she quit smoking about 16 years ago. Her smoking use included cigarettes. She has never used smokeless tobacco. She reports current alcohol use. She reports that she does not use drugs. She completed her Systems Review which is documented below. Any positive systems not related to Gyn are recommended to discuss with her PCP. Review of Systems   Constitutional: Negative. Negative for unexpected weight change. HENT: Negative. Eyes: Negative. Respiratory: Negative. Negative for cough and shortness of breath. Cardiovascular: Negative. Negative for chest pain and palpitations. Gastrointestinal: Negative. Negative for abdominal pain and blood in stool. Endocrine: Negative. Genitourinary: Negative. Negative for difficulty urinating, dysuria, menstrual problem, pelvic pain and urgency. Musculoskeletal: Negative. Skin: Negative. Allergic/Immunologic: Negative. Neurological: Negative. Hematological: Negative. Psychiatric/Behavioral: Negative. Negative for behavioral problems and confusion. All other systems reviewed and are negative. Results for orders placed or performed in visit on 12/08/22   AMB POC OCCULT, OTHER SOURCE   Result Value Ref Range    Valid Internal Control, POC yes     Occult Blood, Other negative       Height 5' 3\" (1.6 m), weight 207 lb (93.9 kg). Body mass index is 36.67 kg/m². Wt Readings from Last 3 Encounters:   12/08/22 207 lb (93.9 kg)   08/10/22 202 lb 12.8 oz (92 kg)   04/20/22 198 lb (89.8 kg)      Physical Exam  Vitals reviewed. Exam conducted with a chaperone present. Constitutional:       General: She is not in acute distress. Appearance: Normal appearance. HENT:      Head: Normocephalic and atraumatic. Eyes:      Extraocular Movements: Extraocular movements intact. Pupils: Pupils are equal, round, and reactive to light.    Pulmonary:      Effort: Pulmonary effort is normal. No respiratory distress. Chest:   Breasts:     Breasts are symmetrical.      Right: Normal. No bleeding, inverted nipple, mass, nipple discharge or skin change. Left: Normal. No bleeding, inverted nipple, mass, nipple discharge or skin change. Abdominal:      General: Abdomen is flat. Bowel sounds are normal. There is no distension. Palpations: Abdomen is soft. There is no mass. Tenderness: There is no abdominal tenderness. There is no guarding or rebound. Hernia: No hernia is present. Genitourinary:     General: Normal vulva. Exam position: Lithotomy position. Labia:         Right: No rash, tenderness or lesion. Left: No rash, tenderness or lesion. Vagina: Normal.      Adnexa: Right adnexa normal and left adnexa normal.      Rectum: Normal. Guaiac result negative. Comments: Uterus / cervix surgically absent  Musculoskeletal:      Cervical back: Normal range of motion and neck supple. Lymphadenopathy:      Upper Body:      Right upper body: No axillary adenopathy. Left upper body: No axillary adenopathy. Skin:     General: Skin is warm and dry. Neurological:      General: No focal deficit present. Mental Status: She is alert and oriented to person, place, and time. Mental status is at baseline. Psychiatric:         Mood and Affect: Mood normal.         Behavior: Behavior normal.         Thought Content: Thought content normal.         Judgment: Judgment normal.        Assessment/plan: Yashira was seen today for Annual Exam       Yashira was seen today for annual exam.    Diagnoses and all orders for this visit:    Well woman exam with routine gynecological exam    Encounter for screening mammogram for malignant neoplasm of breast  -     HARLAN DIGITAL SCREEN W OR WO CAD BILATERAL;  Future       Return in about 1 year (around 12/8/2023) for Annual.

## 2022-12-13 DIAGNOSIS — G43.109 MIGRAINE WITH AURA AND WITHOUT STATUS MIGRAINOSUS, NOT INTRACTABLE: ICD-10-CM

## 2022-12-13 RX ORDER — ERENUMAB-AOOE 140 MG/ML
140 INJECTION, SOLUTION SUBCUTANEOUS
Qty: 3 ADJUSTABLE DOSE PRE-FILLED PEN SYRINGE | Refills: 4 | Status: SHIPPED | OUTPATIENT
Start: 2022-12-13 | End: 2023-02-10 | Stop reason: SDUPTHER

## 2023-01-13 ENCOUNTER — TELEPHONE (OUTPATIENT)
Dept: NEUROLOGY | Age: 58
End: 2023-01-13

## 2023-01-18 ENCOUNTER — TELEPHONE (OUTPATIENT)
Dept: FAMILY MEDICINE CLINIC | Facility: CLINIC | Age: 58
End: 2023-01-18

## 2023-01-18 NOTE — TELEPHONE ENCOUNTER
----- Message from Bry Hale sent at 12/19/2022 11:30 AM EST -----  Subject: Message to Provider    QUESTIONS  Information for Provider? Patient came to the office on 12/13 to drop off   some paperwork from [employer] - regarding assistance with her medication   copay. Last page of form needed filled out by PCP. Patient also had prior   authorization from [facility] sent over on same day, 12/13. She was   calling to check in on the status of both of these. Please give her a call   back ASAP.   ---------------------------------------------------------------------------  --------------  [id number] [employer]  [phone]; OK to leave message on voicemail  ---------------------------------------------------------------------------  --------------  SCRIPT ANSWERS  Relationship to Patient?  Self

## 2023-02-02 NOTE — PROGRESS NOTES
Pt cancelled due to illness. High Dose Vitamin A Pregnancy And Lactation Text: High dose vitamin A therapy is contraindicated during pregnancy and breast feeding.

## 2023-02-03 ENCOUNTER — NURSE ONLY (OUTPATIENT)
Dept: FAMILY MEDICINE CLINIC | Facility: CLINIC | Age: 58
End: 2023-02-03

## 2023-02-03 DIAGNOSIS — E78.5 DYSLIPIDEMIA: ICD-10-CM

## 2023-02-03 DIAGNOSIS — Z11.4 SCREENING FOR HIV (HUMAN IMMUNODEFICIENCY VIRUS): ICD-10-CM

## 2023-02-03 DIAGNOSIS — I10 ESSENTIAL HYPERTENSION: ICD-10-CM

## 2023-02-03 LAB
ALBUMIN SERPL-MCNC: 3.9 G/DL (ref 3.5–5)
ALBUMIN/GLOB SERPL: 1.2 (ref 0.4–1.6)
ALP SERPL-CCNC: 97 U/L (ref 50–136)
ALT SERPL-CCNC: 34 U/L (ref 12–65)
ANION GAP SERPL CALC-SCNC: 8 MMOL/L (ref 2–11)
AST SERPL-CCNC: 19 U/L (ref 15–37)
BILIRUB SERPL-MCNC: 0.3 MG/DL (ref 0.2–1.1)
BUN SERPL-MCNC: 28 MG/DL (ref 6–23)
CALCIUM SERPL-MCNC: 9.8 MG/DL (ref 8.3–10.4)
CHLORIDE SERPL-SCNC: 109 MMOL/L (ref 101–110)
CHOLEST SERPL-MCNC: 159 MG/DL
CO2 SERPL-SCNC: 25 MMOL/L (ref 21–32)
CREAT SERPL-MCNC: 1 MG/DL (ref 0.6–1)
GLOBULIN SER CALC-MCNC: 3.3 G/DL (ref 2.8–4.5)
GLUCOSE SERPL-MCNC: 90 MG/DL (ref 65–100)
HDLC SERPL-MCNC: 56 MG/DL (ref 40–60)
HDLC SERPL: 2.8
HIV 1+2 AB+HIV1 P24 AG SERPL QL IA: NONREACTIVE
HIV 1/2 RESULT COMMENT: NORMAL
LDLC SERPL CALC-MCNC: 77.2 MG/DL
POTASSIUM SERPL-SCNC: 4.2 MMOL/L (ref 3.5–5.1)
PROT SERPL-MCNC: 7.2 G/DL (ref 6.3–8.2)
SODIUM SERPL-SCNC: 142 MMOL/L (ref 133–143)
TRIGL SERPL-MCNC: 129 MG/DL (ref 35–150)
VLDLC SERPL CALC-MCNC: 25.8 MG/DL (ref 6–23)

## 2023-02-09 NOTE — PROGRESS NOTES
Yashira Rand (: 1965) is a 56 y.o. female, an established patient, is here for evaluation of the following chief complaint(s):  Chief Complaint   Patient presents with    Hypertension    Anxiety    Results    Migraine        ASSESSMENT/PLAN:  Angeles Balbuena was seen today for hypertension, anxiety, results and migraine. Diagnoses and all orders for this visit:    Essential hypertension  -     indapamide (LOZOL) 2.5 MG tablet; Take 1 tablet by mouth daily  -     Basic Metabolic Panel; Future    Dyslipidemia  -     ezetimibe-simvastatin (VYTORIN) 10-20 MG per tablet; Take 1 tablet by mouth nightly    Hypothyroidism, adult  -     TSH; Future    Cervicalgia  -     tiZANidine (ZANAFLEX) 4 MG tablet; Takes 1 tablet at bedtime    Migraine with aura and without status migrainosus, not intractable  -     Erenumab-aooe (AIMOVIG) 140 MG/ML SOAJ; Inject 140 mg into the skin every 30 days    Anxiety    Chronic left-sided low back pain without sciatica  -     tiZANidine (ZANAFLEX) 4 MG tablet; Takes 1 tablet at bedtime  -     XR LUMBAR SPINE (2-3 VIEWS); Future    Severe obesity (BMI 35.0-39. 9) with comorbidity ([address]    Hypokalemia  -     potassium chloride (KLOR-CON M) 20 MEQ extended release tablet; Take 1 tablet by mouth 2 times daily    I reviewed recent lab results w/  Ms. Rand. The 10-year ASCVD risk score (Kayla MICHAUD, et al., 2019) is: 1.8%    Values used to calculate the score:      Age: 56 years      Sex: Female      Is Non- : No      Diabetic: No      Tobacco smoker: No      Systolic Blood Pressure: 705 mmHg      Is BP treated: Yes      HDL Cholesterol: 56 MG/DL      Total Cholesterol: 159 MG/DL  Currently prescribed:   Key Hyperlipidemia Meds            colestipol (COLESTID) 1 g tablet    Class: Historical Med    ezetimibe-simvastatin (VYTORIN) 10-20 MG per tablet    Class: Historical Med     Renal function is WNL    BP today is: well controlled.   Currently prescribed:   Key Anti-Hypertensive Meds            lisinopril-hydroCHLOROthiazide (PRINZIDE;ZESTORETIC) 10-12.5 MG per tablet    Sig - Route: Take 1 tablet by mouth in the morning. - Oral    indapamide (LOZOL) 2.5 MG tablet    Class: Historical Med     She continues Potassium 20MeQ BID for hypokalemia which is treated to goal.      She continues Lorazepam 1 mg 1-2 daily prn. States that at this time, she typically only needs to take 2-3 times each month. She has a h/o chronic lower back pain and has been told previously that she has a disc that has been degenerating. She works on regular stretching and exercises to control pain, but pain is now more persistent. Refilled Tizanidine for now and am sending for an xray of her Lspine. She continues receive cervical injections Q 3 mos w/ Dr. Caitlyn Cruz for chronic neck pain. Next injection will be 3/13/23. Her insurance is now requiring her to wait 3 mos and 1 day between her f/u injections. Her injection is then scheduled 1-2 weeks later. She continues to have increasing neck pain until next injection can be administered. Last HA was Jan 17th and lasted 129 hours. Still averaging one a month  had 2 HA's in December. Needing forms completed for patient assistance and a new PA for Aimovig injections which she is administering monthly. Rev'd note dated 12/8/22 w/ Dr. Chen Oh:  seen for CPE/Pelvic exam.      Follow Up    Return for 6 mos Initial MWV/HTN/Migraine Mgt w/ labs prior to visit. SUBJECTIVE/OBJECTIVE:  At her visit On 8/10/22, the following was discussed:     She's been approved for disability and now has insurance through [employer] as secondary. States that a PA may be needed to continue some of her meds for tx of migraine. Her neurologist is prescibing Nurtec & Zonisamide, but we have been prescribing Aimovig. At today's visit:     Average BP  117-122/79-83    Last JUAN was Jan 17th and lasted 129 hours.   Still averaging one a month  had 2 HA's in December. Vitals:    02/10/23 0803   Pulse: 68   Resp: 16   Temp: 97.8 °F (36.6 °C)   TempSrc: Oral   SpO2: 99%   Weight: 206 lb 4.8 oz (93.6 kg)   Height: 5' 3\" (1.6 m)                    Orders Placed This Encounter    XR LUMBAR SPINE (2-3 VIEWS)     Standing Status:   Future     Number of Occurrences:   1     Standing Expiration Date:   4/38/3828    Basic Metabolic Panel     Standing Status:   Future     Standing Expiration Date:   2/10/2024    TSH     Standing Status:   Future     Standing Expiration Date:   2/10/2024    ezetimibe-simvastatin (VYTORIN) 10-20 MG per tablet     Sig: Take 1 tablet by mouth nightly     Dispense:  90 tablet     Refill:  3    indapamide (LOZOL) 2.5 MG tablet     Sig: Take 1 tablet by mouth daily     Dispense:  90 tablet     Refill:  3    potassium chloride (KLOR-CON M) 20 MEQ extended release tablet     Sig: Take 1 tablet by mouth 2 times daily     Dispense:  180 tablet     Refill:  3    tiZANidine (ZANAFLEX) 4 MG tablet     Sig: Takes 1 tablet at bedtime     Dispense:  90 tablet     Refill:  1    Erenumab-aooe (AIMOVIG) 140 MG/ML SOAJ     Sig: Inject 140 mg into the skin every 30 days     Dispense:  3 Adjustable Dose Pre-filled Pen Syringe     Refill:  4               An electronic signature was used to authenticate this note.   -- BRYON Llanes

## 2023-02-10 ENCOUNTER — OFFICE VISIT (OUTPATIENT)
Dept: FAMILY MEDICINE CLINIC | Facility: CLINIC | Age: 58
End: 2023-02-10
Payer: COMMERCIAL

## 2023-02-10 ENCOUNTER — HOSPITAL ENCOUNTER (OUTPATIENT)
Dept: GENERAL RADIOLOGY | Age: 58
Discharge: HOME | End: 2023-02-10
Payer: COMMERCIAL

## 2023-02-10 VITALS
HEART RATE: 68 BPM | BODY MASS INDEX: 36.55 KG/M2 | HEIGHT: 63 IN | TEMPERATURE: 97.8 F | WEIGHT: 206.3 LBS | OXYGEN SATURATION: 99 % | RESPIRATION RATE: 16 BRPM

## 2023-02-10 DIAGNOSIS — E78.5 DYSLIPIDEMIA: ICD-10-CM

## 2023-02-10 DIAGNOSIS — M54.2 CERVICALGIA: ICD-10-CM

## 2023-02-10 DIAGNOSIS — G89.29 CHRONIC LEFT-SIDED LOW BACK PAIN WITHOUT SCIATICA: ICD-10-CM

## 2023-02-10 DIAGNOSIS — F41.9 ANXIETY: ICD-10-CM

## 2023-02-10 DIAGNOSIS — E87.6 HYPOKALEMIA: ICD-10-CM

## 2023-02-10 DIAGNOSIS — E03.9 HYPOTHYROIDISM, ADULT: ICD-10-CM

## 2023-02-10 DIAGNOSIS — M54.50 CHRONIC LEFT-SIDED LOW BACK PAIN WITHOUT SCIATICA: ICD-10-CM

## 2023-02-10 DIAGNOSIS — E66.01 SEVERE OBESITY (BMI 35.0-39.9) WITH COMORBIDITY (HCC): ICD-10-CM

## 2023-02-10 DIAGNOSIS — I10 ESSENTIAL HYPERTENSION: Primary | ICD-10-CM

## 2023-02-10 DIAGNOSIS — G43.109 MIGRAINE WITH AURA AND WITHOUT STATUS MIGRAINOSUS, NOT INTRACTABLE: ICD-10-CM

## 2023-02-10 PROBLEM — M51.369 DDD (DEGENERATIVE DISC DISEASE), LUMBAR: Status: ACTIVE | Noted: 2023-02-10

## 2023-02-10 PROBLEM — M43.16 SPONDYLOLISTHESIS OF LUMBAR REGION: Status: ACTIVE | Noted: 2023-02-10

## 2023-02-10 PROBLEM — M51.36 DDD (DEGENERATIVE DISC DISEASE), LUMBAR: Status: ACTIVE | Noted: 2023-02-10

## 2023-02-10 PROCEDURE — 99214 OFFICE O/P EST MOD 30 MIN: CPT | Performed by: PHYSICIAN ASSISTANT

## 2023-02-10 PROCEDURE — 72100 X-RAY EXAM L-S SPINE 2/3 VWS: CPT

## 2023-02-10 RX ORDER — TIZANIDINE 4 MG/1
TABLET ORAL
Qty: 90 TABLET | Refills: 1 | Status: SHIPPED | OUTPATIENT
Start: 2023-02-10

## 2023-02-10 RX ORDER — ERENUMAB-AOOE 140 MG/ML
140 INJECTION, SOLUTION SUBCUTANEOUS
Qty: 3 ADJUSTABLE DOSE PRE-FILLED PEN SYRINGE | Refills: 4 | Status: SHIPPED | OUTPATIENT
Start: 2023-02-10

## 2023-02-10 RX ORDER — EZETIMIBE AND SIMVASTATIN 10; 20 MG/1; MG/1
1 TABLET ORAL NIGHTLY
Qty: 90 TABLET | Refills: 3 | Status: SHIPPED | OUTPATIENT
Start: 2023-02-10

## 2023-02-10 RX ORDER — INDAPAMIDE 2.5 MG/1
2.5 TABLET, FILM COATED ORAL DAILY
Qty: 90 TABLET | Refills: 3 | Status: SHIPPED | OUTPATIENT
Start: 2023-02-10

## 2023-02-10 RX ORDER — POTASSIUM CHLORIDE 20 MEQ/1
20 TABLET, EXTENDED RELEASE ORAL 2 TIMES DAILY
Qty: 180 TABLET | Refills: 3 | Status: SHIPPED | OUTPATIENT
Start: 2023-02-10

## 2023-02-10 SDOH — ECONOMIC STABILITY: FOOD INSECURITY: WITHIN THE PAST 12 MONTHS, THE FOOD YOU BOUGHT JUST DIDN'T LAST AND YOU DIDN'T HAVE MONEY TO GET MORE.: SOMETIMES TRUE

## 2023-02-10 SDOH — ECONOMIC STABILITY: HOUSING INSECURITY
IN THE LAST 12 MONTHS, WAS THERE A TIME WHEN YOU DID NOT HAVE A STEADY PLACE TO SLEEP OR SLEPT IN A SHELTER (INCLUDING NOW)?: NO

## 2023-02-10 SDOH — ECONOMIC STABILITY: INCOME INSECURITY: HOW HARD IS IT FOR YOU TO PAY FOR THE VERY BASICS LIKE FOOD, HOUSING, MEDICAL CARE, AND HEATING?: SOMEWHAT HARD

## 2023-02-10 SDOH — ECONOMIC STABILITY: FOOD INSECURITY: WITHIN THE PAST 12 MONTHS, YOU WORRIED THAT YOUR FOOD WOULD RUN OUT BEFORE YOU GOT MONEY TO BUY MORE.: SOMETIMES TRUE

## 2023-02-10 ASSESSMENT — PATIENT HEALTH QUESTIONNAIRE - PHQ9
SUM OF ALL RESPONSES TO PHQ9 QUESTIONS 1 & 2: 0
SUM OF ALL RESPONSES TO PHQ QUESTIONS 1-9: 0
SUM OF ALL RESPONSES TO PHQ QUESTIONS 1-9: 0
1. LITTLE INTEREST OR PLEASURE IN DOING THINGS: 0
SUM OF ALL RESPONSES TO PHQ QUESTIONS 1-9: 0
2. FEELING DOWN, DEPRESSED OR HOPELESS: 0
SUM OF ALL RESPONSES TO PHQ QUESTIONS 1-9: 0

## 2023-02-10 NOTE — PATIENT INSTRUCTIONS
FOOD RESOURCES    Meals on Wheels:  What they offer: Meals on Wheels is a program that delivers meals to individuals who have no reliable means for maintaining a healthy diet. Lilo Phone: [phone]  www. MoodswiingonDanie. [address]:  What they offer:  Anyone is eligible to order, but Maria Del Carmen Pearce is specifically designed for customers who could benefit from accessible, low-cost fresh food. Fresh Food Boxes are $15* with credit/debit card and are ALWAYS $5 with SNAP/EBT   Boxes are distributed every other week and you must preorder your box through their website  Drive-thru box pickup is every other Wednesday from 11 am-6 pm at: [address] [facility], [address] [facility]  Website:  [email or web address]. [email or web address]/fsg     Food Pantries:   [address]   Phone: [phone]  Located in [place] [address], [facility].  Open Thursdays 8a-12p  Website: www[employer]: [phone]  Located at [address] 8am-12pm Fridays  Website: https://CouncilInfusion MedicalJohn[facility] Hospital[facility]. org/   [address] Pantry  Phone: [phone]  Located at [address].  Call for Pantry hours and availability  Website: [facility].  Water of [address] [place] [address] Pantry  Phone: [phone]  Located at [phone]  Call for Pantry hours and availability    Website: Moximed.pl. php  [address]  Phone: [phone]  Located at [facility] 56. Emergency Food Pantry Hours: Mon, Wed, and Fri 9am-1pm  Website: http://www.PharmAssistant/  [address] Pantry  Phone: [phone]  Located at [address] [facility].   Call for hours and availability   Website: [email or web address]://Sanlorenzo/  [address] Pantry  Phone: [phone]  Located at [address] [place] [address] Cosmo.  Call for hours and availability; serves up to 76 families a week, based on donations  Website: [email or web address]://Lumier. cc/      Need additional resources? Call 211 or Find Help: https://www. findhelp.org/FINANCIAL RESOURCES    [employer]   o What they offer: The [employer] Program helps uninsured patients who do not qualify for government-sponsored health insurance and cannot afford to pay for their medical care. Insured patients may also qualify for assistance based on family income, family size, and medical needs. o Phone Number: [phone]      o How to apply for the [employer] Program:   Option 1: To apply for financial assistance, a patient (or their family or other provider) should fill out the Financial Assistance Application. Copies of the Financial Assistance Application and the FAP may be obtained for free by calling the [facility] Social Pointer service department at [phone]. Option 2: The Financial Assistance Application and policy may be obtained for free by downloading a copy from the [employer]or: o http://[employer] Networks.Daily News Online/. com/patient-resources/financial-assistance       o Applications are available in several languages on the website     [employer].  What they offer:  May be able to assist with medical bills if you are uninsured. Phone number: [phone]  www. [employer] and Resources for the Medley's (uninsured and under insured) A Nurse and  are available. [place] speaking staff available   What they offer: Provide information and assistance for the whole family  Discuss your health and help you find a doctor if you need one. Answer questions about your medications. Diabetes Self-Management education. Connect you with financial assistance agencies, social and medical services. Provide moral support during difficult times.   Unfortunately they are unable to administer any medicine, provide you with money or transportation in our vehicles. However, the team will try to help you with your health needs. Phone: [phone] to make an appointment. Leave voice mail with name and call back number. Medication Cost Assistance    Good Rx    o What they offer: Good Rx tracks prescription drug prices and provides drug  coupons for discounts on medications. o Website: [employer]/     NeedyMeds   o What they offer: NeedyMeds offers free information on medications and healthcare cost savings programs including prescription assistance programs, coupons, and discount programs. o Website: PaymentBack.PEPperPRINT org/   o Helpline: 424.626.1869     RX Assist   o What they offer: Information about free and low-cost medicine programs. o Website: https://BovControl/     [facility] $4 Prescription Program   o What they offer: Prescription Program includes up to a 30-day supply for $4 and a 90-day supply for $10 of some covered generic drugs at commonly prescribed dosages   o Website: Dinorah Pereira  What they offer:  If you are uninsured and cannot afford the prescription medicine you need, you may be able to have your prescriptions filled at no cost through ePrimeCare. You must live in [place]. To find out if you qualify. Website: PopPath.it    Cost Plus Drugs  What they offer:  Low-cost versions of high-cost generic drugs  Website: https://costEcopoldrugs. Babble/    [facility] of   https://[facility].gov/  Phone: 858.215.4739      Need additional resources? Call 211 or Find Help: https://www. findhelp.org/

## 2023-02-15 ENCOUNTER — OFFICE VISIT (OUTPATIENT)
Dept: FAMILY MEDICINE CLINIC | Facility: CLINIC | Age: 58
End: 2023-02-15
Payer: COMMERCIAL

## 2023-02-15 VITALS
WEIGHT: 204.6 LBS | TEMPERATURE: 97.6 F | HEIGHT: 63 IN | SYSTOLIC BLOOD PRESSURE: 134 MMHG | BODY MASS INDEX: 36.25 KG/M2 | HEART RATE: 73 BPM | RESPIRATION RATE: 18 BRPM | OXYGEN SATURATION: 99 % | DIASTOLIC BLOOD PRESSURE: 84 MMHG

## 2023-02-15 DIAGNOSIS — U07.1 COVID-19: Primary | ICD-10-CM

## 2023-02-15 DIAGNOSIS — R50.9 FEVER, UNSPECIFIED FEVER CAUSE: ICD-10-CM

## 2023-02-15 PROCEDURE — 3075F SYST BP GE 130 - 139MM HG: CPT | Performed by: FAMILY MEDICINE

## 2023-02-15 PROCEDURE — 99214 OFFICE O/P EST MOD 30 MIN: CPT | Performed by: FAMILY MEDICINE

## 2023-02-15 PROCEDURE — 3079F DIAST BP 80-89 MM HG: CPT | Performed by: FAMILY MEDICINE

## 2023-02-15 ASSESSMENT — PATIENT HEALTH QUESTIONNAIRE - PHQ9
SUM OF ALL RESPONSES TO PHQ QUESTIONS 1-9: 0
1. LITTLE INTEREST OR PLEASURE IN DOING THINGS: 0
2. FEELING DOWN, DEPRESSED OR HOPELESS: 0
SUM OF ALL RESPONSES TO PHQ QUESTIONS 1-9: 0
SUM OF ALL RESPONSES TO PHQ QUESTIONS 1-9: 0
SUM OF ALL RESPONSES TO PHQ9 QUESTIONS 1 & 2: 0
SUM OF ALL RESPONSES TO PHQ QUESTIONS 1-9: 0

## 2023-02-15 ASSESSMENT — ENCOUNTER SYMPTOMS: COUGH: 1

## 2023-02-15 NOTE — PROGRESS NOTES
Emma Rand (: 1965) is a 66 y.o. female, established patient, here for evaluation of the following chief complaint(s):  Cough (Fever, cough, congestion//Started: Saturday night//Medication: took medication)       ASSESSMENT/PLAN:  1. COVID-19  -     nirmatrelvir/ritonavir 300/100 (PAXLOVID) 20 x 150 MG & 10 x 100MG TBPK; Take 3 tablets (two 150 mg nirmatrelvir and one 100 mg ritonavir tablets) by mouth every 12 hours for 5 days. , Disp-30 tablet, R-0Normal  Flu negative, COVID positive. Will treat with Paxlovid. Advised to continue supportive care, adequate hydration, rest.  Discussed quarantine guidelines. Advised to take vitamin C, vitamin D and zinc.  Denies any shortness of breath, chest pain. Will call if no improvement or any new or worsening symptoms develop. Return if symptoms worsen or fail to improve. SUBJECTIVE/OBJECTIVE:  HPI  71-year-old female with history of hypertension, hyperlipidemia who presents for upper respiratory illness. She said her symptoms started on  with a scratchy throat. She took some Coricidin. The last few days her symptoms have worsened, she had fevers up to 102, chills, productive cough. She also has been very fatigued. She has had some nausea, decreased appetite. Denies any diarrhea, chest pain, shortness of breath. She has been able to drink fluids, has been taking vitamin D. She has not had any known sick contacts, she was seen in the office on Friday for chronic issues. She is actually feeling slightly better today. Has had no fevers. Review of Systems   Constitutional:  Positive for appetite change and fever. Negative for activity change and unexpected weight change. Respiratory:  Positive for cough. Cardiovascular:  Negative for chest pain and palpitations. Skin:  Negative for rash. Neurological:  Negative for dizziness and headaches. Psychiatric/Behavioral:  Negative for sleep disturbance.       Vitals:    02/15/23 1017   BP: 134/84   Pulse: 73   Resp: 18   Temp: 97.6 °F (36.4 °C)   SpO2: 99%       Physical Exam  Vitals reviewed. Constitutional:       General: She is not in acute distress. Appearance: Normal appearance. She is not ill-appearing or toxic-appearing. HENT:      Head: Normocephalic and atraumatic. Eyes:      General: No scleral icterus. Right eye: No discharge. Left eye: No discharge. Conjunctiva/sclera: Conjunctivae normal.   Neurological:      Mental Status: She is alert. Psychiatric:         Mood and Affect: Mood normal.         Behavior: Behavior normal.          On this date, I spent 30 minutes reviewing previous notes, test results and face to face with the patient discussing the diagnosis and importance of compliance with the treatment plan as well as documenting on the day of the visit. An electronic signature was used to authenticate this note.   -- Anisa Reynoso MD

## 2023-04-19 ENCOUNTER — OFFICE VISIT (OUTPATIENT)
Dept: NEUROLOGY | Age: 58
End: 2023-04-19
Payer: COMMERCIAL

## 2023-04-19 VITALS
HEIGHT: 63 IN | HEART RATE: 84 BPM | SYSTOLIC BLOOD PRESSURE: 156 MMHG | WEIGHT: 203 LBS | OXYGEN SATURATION: 97 % | BODY MASS INDEX: 35.97 KG/M2 | DIASTOLIC BLOOD PRESSURE: 85 MMHG

## 2023-04-19 DIAGNOSIS — G43.109 MIGRAINE WITH AURA AND WITHOUT STATUS MIGRAINOSUS, NOT INTRACTABLE: Primary | ICD-10-CM

## 2023-04-19 DIAGNOSIS — M48.02 SPINAL STENOSIS OF CERVICAL REGION: ICD-10-CM

## 2023-04-19 DIAGNOSIS — M47.812 CERVICAL SPONDYLOSIS WITHOUT MYELOPATHY: ICD-10-CM

## 2023-04-19 DIAGNOSIS — Z79.899 ENCOUNTER FOR MEDICATION MANAGEMENT: ICD-10-CM

## 2023-04-19 PROCEDURE — 3077F SYST BP >= 140 MM HG: CPT | Performed by: PSYCHIATRY & NEUROLOGY

## 2023-04-19 PROCEDURE — 3079F DIAST BP 80-89 MM HG: CPT | Performed by: PSYCHIATRY & NEUROLOGY

## 2023-04-19 PROCEDURE — 99215 OFFICE O/P EST HI 40 MIN: CPT | Performed by: PSYCHIATRY & NEUROLOGY

## 2023-04-19 RX ORDER — ZONISAMIDE 50 MG/1
50 CAPSULE ORAL 2 TIMES DAILY
Qty: 180 CAPSULE | Refills: 3 | Status: SHIPPED | OUTPATIENT
Start: 2023-04-19 | End: 2023-07-18

## 2023-04-19 ASSESSMENT — ENCOUNTER SYMPTOMS
DOUBLE VISION: 0
PHOTOPHOBIA: 0
BLURRED VISION: 1

## 2023-04-19 ASSESSMENT — VISUAL ACUITY: OU: 1

## 2023-04-19 NOTE — PROGRESS NOTES
NEUROLOGY  RETURN  OFFICE VISIT [de-identified]                     4/19/2023  Yashira Rand is a 66 y.o. female here for [de-identified]        Referred by   BRYON Reynoso       Chief Complaint:   Chief Complaint   Patient presents with    Follow-up     Unaccompanied[de-identified]               Post menopausal and TONYA oop         Active Problems:    * No active hospital problems. *  Resolved Problems:    * No resolved hospital problems.  *    Past Medical History:   Diagnosis Date    History of chicken pox     History of cystocele     repaired    History of measles     History of mumps     Symptomatic menopausal or female climacteric states 10/8/2014     Past Surgical History:   Procedure Laterality Date    BLADDER SUSPENSION      CYSTOCELE REPAIR      GASTRECTOMY  02/06/12    LAP SLEEVE - VIVIANA GIRON    HYSTERECTOMY, TOTAL ABDOMINAL (CERVIX REMOVED)  1998    partial    ORTHOPEDIC SURGERY      RIGHT KNEE WITH TENDON RELEASE    ORTHOPEDIC SURGERY  04/2015    right trigger finger    RECTOCELE REPAIR      REFRACTIVE SURGERY Bilateral     SALPINGO-OOPHORECTOMY  2003    UROLOGICAL SURGERY  2007    bladder tac      Family History   Problem Relation Age of Onset    Stroke Maternal Grandmother     Colon Cancer Neg Hx     Heart Disease Maternal Grandmother     Sleep Apnea Father     Hypertension Father     Elevated Lipids Father     Heart Disease Father     Heart Disease Paternal Grandmother     Hypertension Paternal Grandmother     Elevated Lipids Mother     Hypertension Mother     Sleep Apnea Mother     Other Other         Fibroids AND  HIGH CHOLESTEROL    Ovarian Cancer Maternal Aunt 28    Breast Cancer Neg Hx     Hypertension Maternal Grandmother      Social History     Socioeconomic History    Marital status: Single     Spouse name: None    Number of children: None    Years of education: None    Highest education level: None   Tobacco Use    Smoking status: Former     Types: Cigarettes     Quit date: 10/6/2006     Years since

## 2023-08-01 ENCOUNTER — NURSE ONLY (OUTPATIENT)
Dept: FAMILY MEDICINE CLINIC | Facility: CLINIC | Age: 58
End: 2023-08-01

## 2023-08-01 DIAGNOSIS — E03.9 HYPOTHYROIDISM, ADULT: ICD-10-CM

## 2023-08-01 DIAGNOSIS — I10 ESSENTIAL HYPERTENSION: ICD-10-CM

## 2023-08-01 LAB
ANION GAP SERPL CALC-SCNC: 7 MMOL/L (ref 2–11)
BUN SERPL-MCNC: 25 MG/DL (ref 6–23)
CALCIUM SERPL-MCNC: 10.2 MG/DL (ref 8.3–10.4)
CHLORIDE SERPL-SCNC: 108 MMOL/L (ref 101–110)
CO2 SERPL-SCNC: 27 MMOL/L (ref 21–32)
CREAT SERPL-MCNC: 1.1 MG/DL (ref 0.6–1)
GLUCOSE SERPL-MCNC: 83 MG/DL (ref 65–100)
POTASSIUM SERPL-SCNC: 3.7 MMOL/L (ref 3.5–5.1)
SODIUM SERPL-SCNC: 142 MMOL/L (ref 133–143)
TSH, 3RD GENERATION: 3.3 UIU/ML (ref 0.36–3.74)

## 2023-08-07 SDOH — HEALTH STABILITY: PHYSICAL HEALTH: ON AVERAGE, HOW MANY DAYS PER WEEK DO YOU ENGAGE IN MODERATE TO STRENUOUS EXERCISE (LIKE A BRISK WALK)?: 0 DAYS

## 2023-08-07 ASSESSMENT — PATIENT HEALTH QUESTIONNAIRE - PHQ9
2. FEELING DOWN, DEPRESSED OR HOPELESS: NOT AT ALL
SUM OF ALL RESPONSES TO PHQ QUESTIONS 1-9: 0
2. FEELING DOWN, DEPRESSED OR HOPELESS: 0
1. LITTLE INTEREST OR PLEASURE IN DOING THINGS: 0
SUM OF ALL RESPONSES TO PHQ QUESTIONS 1-9: 0
SUM OF ALL RESPONSES TO PHQ9 QUESTIONS 1 & 2: 0
SUM OF ALL RESPONSES TO PHQ QUESTIONS 1-9: 0
1. LITTLE INTEREST OR PLEASURE IN DOING THINGS: NOT AT ALL
SUM OF ALL RESPONSES TO PHQ9 QUESTIONS 1 & 2: 0
SUM OF ALL RESPONSES TO PHQ QUESTIONS 1-9: 0

## 2023-08-07 ASSESSMENT — LIFESTYLE VARIABLES
HOW OFTEN DO YOU HAVE A DRINK CONTAINING ALCOHOL: 1
HOW MANY STANDARD DRINKS CONTAINING ALCOHOL DO YOU HAVE ON A TYPICAL DAY: PATIENT DOES NOT DRINK
HOW OFTEN DO YOU HAVE A DRINK CONTAINING ALCOHOL: NEVER
HOW OFTEN DO YOU HAVE SIX OR MORE DRINKS ON ONE OCCASION: 1
HOW MANY STANDARD DRINKS CONTAINING ALCOHOL DO YOU HAVE ON A TYPICAL DAY: 0

## 2023-08-10 ENCOUNTER — OFFICE VISIT (OUTPATIENT)
Dept: FAMILY MEDICINE CLINIC | Facility: CLINIC | Age: 58
End: 2023-08-10
Payer: COMMERCIAL

## 2023-08-10 VITALS
WEIGHT: 197.8 LBS | RESPIRATION RATE: 16 BRPM | OXYGEN SATURATION: 99 % | SYSTOLIC BLOOD PRESSURE: 150 MMHG | BODY MASS INDEX: 35.05 KG/M2 | TEMPERATURE: 98.1 F | HEART RATE: 76 BPM | HEIGHT: 63 IN | DIASTOLIC BLOOD PRESSURE: 98 MMHG

## 2023-08-10 DIAGNOSIS — I10 ESSENTIAL HYPERTENSION: ICD-10-CM

## 2023-08-10 DIAGNOSIS — M65.332 TRIGGER MIDDLE FINGER OF LEFT HAND: ICD-10-CM

## 2023-08-10 DIAGNOSIS — Z00.00 MEDICARE ANNUAL WELLNESS VISIT, INITIAL: Primary | ICD-10-CM

## 2023-08-10 DIAGNOSIS — F41.9 ANXIETY: ICD-10-CM

## 2023-08-10 DIAGNOSIS — M54.50 CHRONIC LEFT-SIDED LOW BACK PAIN WITHOUT SCIATICA: ICD-10-CM

## 2023-08-10 DIAGNOSIS — Z71.89 ADVANCE CARE PLANNING: ICD-10-CM

## 2023-08-10 DIAGNOSIS — N28.9 DECREASED RENAL FUNCTION: ICD-10-CM

## 2023-08-10 DIAGNOSIS — E03.9 HYPOTHYROIDISM, ADULT: ICD-10-CM

## 2023-08-10 DIAGNOSIS — M54.2 CERVICALGIA: ICD-10-CM

## 2023-08-10 DIAGNOSIS — G89.29 CHRONIC LEFT-SIDED LOW BACK PAIN WITHOUT SCIATICA: ICD-10-CM

## 2023-08-10 DIAGNOSIS — K52.9 CHRONIC DIARRHEA: ICD-10-CM

## 2023-08-10 DIAGNOSIS — R07.9 CENTRAL CHEST PAIN: ICD-10-CM

## 2023-08-10 DIAGNOSIS — E78.5 DYSLIPIDEMIA: ICD-10-CM

## 2023-08-10 PROCEDURE — 99214 OFFICE O/P EST MOD 30 MIN: CPT | Performed by: PHYSICIAN ASSISTANT

## 2023-08-10 PROCEDURE — 3080F DIAST BP >= 90 MM HG: CPT | Performed by: PHYSICIAN ASSISTANT

## 2023-08-10 PROCEDURE — 99497 ADVNCD CARE PLAN 30 MIN: CPT | Performed by: PHYSICIAN ASSISTANT

## 2023-08-10 PROCEDURE — 3077F SYST BP >= 140 MM HG: CPT | Performed by: PHYSICIAN ASSISTANT

## 2023-08-10 PROCEDURE — G0438 PPPS, INITIAL VISIT: HCPCS | Performed by: PHYSICIAN ASSISTANT

## 2023-08-10 RX ORDER — LORAZEPAM 1 MG/1
1 TABLET ORAL DAILY PRN
Qty: 15 TABLET | Refills: 1 | Status: SHIPPED | OUTPATIENT
Start: 2023-08-10 | End: 2024-02-06

## 2023-08-10 RX ORDER — NALOXONE HYDROCHLORIDE 4 MG/.1ML
SPRAY NASAL
COMMUNITY

## 2023-08-10 RX ORDER — TIZANIDINE 4 MG/1
TABLET ORAL
Qty: 90 TABLET | Refills: 1 | Status: SHIPPED | OUTPATIENT
Start: 2023-08-10

## 2023-08-10 RX ORDER — MONTELUKAST SODIUM 4 MG/1
1 TABLET, CHEWABLE ORAL 2 TIMES DAILY
Qty: 180 TABLET | Refills: 1 | Status: SHIPPED | OUTPATIENT
Start: 2023-08-10

## 2023-08-10 RX ORDER — LISINOPRIL AND HYDROCHLOROTHIAZIDE 12.5; 1 MG/1; MG/1
1 TABLET ORAL DAILY
Qty: 90 TABLET | Refills: 1 | Status: SHIPPED | OUTPATIENT
Start: 2023-08-10

## 2023-08-10 RX ORDER — HYDROCODONE BITARTRATE AND ACETAMINOPHEN 5; 325 MG/1; MG/1
TABLET ORAL
COMMUNITY
Start: 2023-07-17 | End: 2023-08-16

## 2023-08-10 RX ORDER — LEVOTHYROXINE SODIUM 0.12 MG/1
125 TABLET ORAL
Qty: 90 TABLET | Refills: 3 | Status: CANCELLED | OUTPATIENT
Start: 2023-08-10

## 2023-08-17 ENCOUNTER — OFFICE VISIT (OUTPATIENT)
Age: 58
End: 2023-08-17
Payer: COMMERCIAL

## 2023-08-17 VITALS
SYSTOLIC BLOOD PRESSURE: 138 MMHG | DIASTOLIC BLOOD PRESSURE: 72 MMHG | BODY MASS INDEX: 34.91 KG/M2 | HEART RATE: 71 BPM | HEIGHT: 63 IN | WEIGHT: 197 LBS

## 2023-08-17 DIAGNOSIS — I10 ESSENTIAL HYPERTENSION: ICD-10-CM

## 2023-08-17 DIAGNOSIS — R07.89 OTHER CHEST PAIN: ICD-10-CM

## 2023-08-17 DIAGNOSIS — Z01.810 PREOP CARDIOVASCULAR EXAM: Primary | ICD-10-CM

## 2023-08-17 DIAGNOSIS — E78.5 DYSLIPIDEMIA: ICD-10-CM

## 2023-08-17 PROBLEM — I25.10 CORONARY ARTERY DISEASE INVOLVING NATIVE CORONARY ARTERY OF NATIVE HEART WITHOUT ANGINA PECTORIS: Status: RESOLVED | Noted: 2021-11-12 | Resolved: 2023-08-17

## 2023-08-17 PROCEDURE — 99214 OFFICE O/P EST MOD 30 MIN: CPT | Performed by: INTERNAL MEDICINE

## 2023-08-17 PROCEDURE — 93000 ELECTROCARDIOGRAM COMPLETE: CPT | Performed by: INTERNAL MEDICINE

## 2023-08-17 PROCEDURE — 3075F SYST BP GE 130 - 139MM HG: CPT | Performed by: INTERNAL MEDICINE

## 2023-08-17 PROCEDURE — 3078F DIAST BP <80 MM HG: CPT | Performed by: INTERNAL MEDICINE

## 2023-08-17 ASSESSMENT — ENCOUNTER SYMPTOMS: SHORTNESS OF BREATH: 0

## 2023-08-17 NOTE — PROGRESS NOTES
[address] [place], [place]  [id number] [place] [address], [facility], [address]  PHONE: [phone]    Yashira Gonzalezly  1965      SUBJECTIVE:   Edilberto Gutiérrez is a 66 y.o. female seen for a follow up visit regarding the following:     Chief Complaint   Patient presents with    Coronary Artery Disease       HPI:    Patient presents for follow-up requested by her gastroenterologist.  She has been having episodes of intermittent chest pain for the last year. She describes a sudden onset of severe pain in the center of her chest radiating to her neck. Typically this resolves after drinking liquids and taking an aspirin. She estimates that she has had 6 episodes over the last year. She was seen by gastroenterology who wants to proceed with a EGD but requested GI clearance. She has no history established coronary artery disease. Her history a low risk calcium score of 32 in the past and a normal stress test.  She is active and does a significant amount of yard work. She states she puts mowers at times without any chest pain. She does have dyspnea on exertion with moderate to strenuous activity but no chest discomfort similar to what she has with these episodes described above. Blood pressure and cholesterol been well controlled. Past Medical History, Past Surgical History, Family history, Social History, and Medications were all reviewed with the patient today and updated as necessary.            Current Outpatient Medications:     naloxone 4 MG/0.1ML LIQD nasal spray, as directed Nasally, Disp: , Rfl:     colestipol (COLESTID) 1 g tablet, Take 1 tablet by mouth 2 times daily, Disp: 180 tablet, Rfl: 1    lisinopril-hydroCHLOROthiazide (PRINZIDE;ZESTORETIC) 10-12.5 MG per tablet, Take 1 tablet by mouth daily, Disp: 90 tablet, Rfl: 1    LORazepam (ATIVAN) 1 MG tablet, Take 1 tablet by mouth daily as needed for Anxiety for up to 180 days. (Usually needs 2-3x/month) Max Daily Amount: 1 mg, Disp: 15 tablet,

## 2023-08-22 ENCOUNTER — OFFICE VISIT (OUTPATIENT)
Dept: ORTHOPEDIC SURGERY | Age: 58
End: 2023-08-22

## 2023-08-22 VITALS — BODY MASS INDEX: 34.91 KG/M2 | HEIGHT: 63 IN | WEIGHT: 197 LBS

## 2023-08-22 DIAGNOSIS — M65.332 TRIGGER MIDDLE FINGER OF LEFT HAND: Primary | ICD-10-CM

## 2023-08-22 RX ORDER — BETAMETHASONE SODIUM PHOSPHATE AND BETAMETHASONE ACETATE 3; 3 MG/ML; MG/ML
6 INJECTION, SUSPENSION INTRA-ARTICULAR; INTRALESIONAL; INTRAMUSCULAR; SOFT TISSUE ONCE
Status: COMPLETED | OUTPATIENT
Start: 2023-08-22 | End: 2023-08-22

## 2023-08-22 RX ADMIN — BETAMETHASONE SODIUM PHOSPHATE AND BETAMETHASONE ACETATE 6 MG: 3; 3 INJECTION, SUSPENSION INTRA-ARTICULAR; INTRALESIONAL; INTRAMUSCULAR; SOFT TISSUE at 08:43

## 2023-08-22 NOTE — PROGRESS NOTES
levels were discussed. The patient consented for an injection. The patient has been identified by name and birthdate. The injection site was identified, marked and prepped with a alcohol swab. Time out completed. The A1 pulley of the left middle finger was/were injected with a 25 gauge needle with 1ml of 6mg/ml Betamethasone and 1ml xylocaine plain 1%. The injection site was then dressed with a bandaid. The patient tolerated the injection well. The patient was instructed to monitor their blood sugars if diabetic and call if any concerns. The patient was instructed to call the office if any adverse local effects occurred or any if any questions or concerns arise. Patient voiced accordance and understanding of the information provided and the formulated plan. All questions were answered to the patient's satisfaction during the encounter.       Nicanor Lopez MD  Orthopaedic Surgery  08/22/23  8:42 AM

## 2023-09-13 PROBLEM — K76.0 FATTY LIVER: Status: ACTIVE | Noted: 2023-09-13

## 2023-11-29 ENCOUNTER — HOSPITAL ENCOUNTER (OUTPATIENT)
Dept: MAMMOGRAPHY | Age: 58
Discharge: HOME | End: 2023-12-02
Payer: COMMERCIAL

## 2023-11-29 DIAGNOSIS — Z12.31 VISIT FOR SCREENING MAMMOGRAM: ICD-10-CM

## 2023-11-29 PROCEDURE — 77067 SCR MAMMO BI INCL CAD: CPT

## 2023-12-06 ENCOUNTER — OFFICE VISIT (OUTPATIENT)
Dept: FAMILY MEDICINE CLINIC | Facility: CLINIC | Age: 58
End: 2023-12-06
Payer: COMMERCIAL

## 2023-12-06 VITALS
HEIGHT: 63 IN | BODY MASS INDEX: 35.08 KG/M2 | HEART RATE: 72 BPM | OXYGEN SATURATION: 98 % | TEMPERATURE: 98 F | WEIGHT: 198 LBS | SYSTOLIC BLOOD PRESSURE: 126 MMHG | DIASTOLIC BLOOD PRESSURE: 98 MMHG

## 2023-12-06 DIAGNOSIS — J06.9 VIRAL URI WITH COUGH: Primary | ICD-10-CM

## 2023-12-06 PROCEDURE — 3080F DIAST BP >= 90 MM HG: CPT

## 2023-12-06 PROCEDURE — 99213 OFFICE O/P EST LOW 20 MIN: CPT

## 2023-12-06 PROCEDURE — 3074F SYST BP LT 130 MM HG: CPT

## 2023-12-06 RX ORDER — BENZONATATE 100 MG/1
100 CAPSULE ORAL 3 TIMES DAILY PRN
Qty: 30 CAPSULE | Refills: 0 | Status: SHIPPED | OUTPATIENT
Start: 2023-12-06 | End: 2023-12-16

## 2023-12-06 RX ORDER — OMEPRAZOLE 40 MG/1
90 CAPSULE, DELAYED RELEASE ORAL DAILY
COMMUNITY
Start: 2023-09-28

## 2023-12-06 NOTE — PATIENT INSTRUCTIONS
Sinus congestion:  -Sinus rinse: recommend doing this every morning (use saline if comes with pot or distilled water only). After rinse, I would recommend Flonase 2 sprays each nostril daily.   -Dayquil/Nyquil as needed (do not take additional tylenol when taking this)    Cough:  -Tessalon perles as needed  -Honey 1 tsp throughout the day (combine in hot teas with lemon)  -Dayquil/nyquil as needed    Sore throat:  -Lozenges/sprays  -warm fluids  -salt water gargles (1/2 tsp salt in 8 oz warm water). Make sure to hydrate well with water. Cough/deep breathe every hour. Be sure to continue normal activity.

## 2023-12-11 ENCOUNTER — OFFICE VISIT (OUTPATIENT)
Dept: OBGYN CLINIC | Age: 58
End: 2023-12-11
Payer: COMMERCIAL

## 2023-12-11 VITALS
SYSTOLIC BLOOD PRESSURE: 122 MMHG | HEIGHT: 63 IN | BODY MASS INDEX: 34.55 KG/M2 | WEIGHT: 195 LBS | DIASTOLIC BLOOD PRESSURE: 74 MMHG

## 2023-12-11 DIAGNOSIS — Z01.419 WELL WOMAN EXAM WITH ROUTINE GYNECOLOGICAL EXAM: Primary | ICD-10-CM

## 2023-12-11 DIAGNOSIS — Z12.31 ENCOUNTER FOR SCREENING MAMMOGRAM FOR MALIGNANT NEOPLASM OF BREAST: ICD-10-CM

## 2023-12-11 PROCEDURE — 3074F SYST BP LT 130 MM HG: CPT | Performed by: OBSTETRICS & GYNECOLOGY

## 2023-12-11 PROCEDURE — 3078F DIAST BP <80 MM HG: CPT | Performed by: OBSTETRICS & GYNECOLOGY

## 2023-12-11 PROCEDURE — 99396 PREV VISIT EST AGE 40-64: CPT | Performed by: OBSTETRICS & GYNECOLOGY

## 2023-12-11 ASSESSMENT — ENCOUNTER SYMPTOMS
EYES NEGATIVE: 1
GASTROINTESTINAL NEGATIVE: 1
COUGH: 0
BLOOD IN STOOL: 0
RESPIRATORY NEGATIVE: 1
ABDOMINAL PAIN: 0
ALLERGIC/IMMUNOLOGIC NEGATIVE: 1
SHORTNESS OF BREATH: 0

## 2023-12-11 NOTE — PROGRESS NOTES
Uriel Davis is 66 y.o. female, , who presents today for a routine annual gynecological examination. No LMP recorded. Patient has had a hysterectomy. . Doing well. No Gyn, Breast, G/U, or GI complaints. Off HRT several years now, doing well. Followed by GI for some upper gi concerns, having hida scan soon. 2023     8:00 AM 2022     9:00 AM   Mammogram/Pap Hx   Mammogram Date 2023   Mammogram Result negative WNL   Pap Date 3/28/2017 3/18/2017   Pap Result negative WNL         2023     8:00 AM 2022     9:00 AM   GYN Intake Questionnaire   Current Form of Contraception Hysterectomy Hysterectomy   Previous Form of Contraception  None   Menarche  14   Period Cycle  Post Menopausal   Dyspareunia  None   Post-Coital Bleeding  None   Date of Mammogram 2023   Result of Mammogram negative WNL   Date of Pap 3/28/2017 3/18/2017   Pap result negative WNL       Contraception:  hyst  Has received Gardisil: NO  Last [place]el 6 years ago  Last time cholesterol was checked: with in last 6 months. OB History:   OB History    Para Term  AB Living   0 0 0 0 0 0   SAB IAB Ectopic Molar Multiple Live Births   0 0 0 0 0 0         Health Maintenance Due   Topic Date Due    Hepatitis B vaccine (1 of 3 - 3-dose series) Never done    Flu vaccine (1) 2023    COVID-19 Vaccine ( season) 2023         GYN History            Yashira  has a past medical history of Headache, History of chicken pox, History of cystocele, History of measles, History of mumps, Sleep apnea, and Symptomatic menopausal or female climacteric states. .    Her surgeries include  has a past surgical history that includes Salpingo-oophorectomy (); Rectocele repair; bladder suspension; Urological Surgery (); gastrectomy (12); Hysterectomy, total abdominal (); Cystocele repair; orthopedic surgery; orthopedic surgery (2015);  Refractive surgery

## 2023-12-26 ENCOUNTER — PATIENT MESSAGE (OUTPATIENT)
Dept: ORTHOPEDIC SURGERY | Age: 58
End: 2023-12-26

## 2024-01-15 ENCOUNTER — OFFICE VISIT (OUTPATIENT)
Dept: ORTHOPEDIC SURGERY | Age: 59
End: 2024-01-15
Payer: COMMERCIAL

## 2024-01-15 VITALS — WEIGHT: 189 LBS | HEIGHT: 63 IN | BODY MASS INDEX: 33.49 KG/M2

## 2024-01-15 DIAGNOSIS — M79.641 RIGHT HAND PAIN: ICD-10-CM

## 2024-01-15 DIAGNOSIS — M65.332 TRIGGER MIDDLE FINGER OF LEFT HAND: Primary | ICD-10-CM

## 2024-01-15 PROCEDURE — 99213 OFFICE O/P EST LOW 20 MIN: CPT | Performed by: ORTHOPAEDIC SURGERY

## 2024-01-15 PROCEDURE — 20550 NJX 1 TENDON SHEATH/LIGAMENT: CPT | Performed by: ORTHOPAEDIC SURGERY

## 2024-01-15 RX ORDER — BETAMETHASONE SODIUM PHOSPHATE AND BETAMETHASONE ACETATE 3; 3 MG/ML; MG/ML
6 INJECTION, SUSPENSION INTRA-ARTICULAR; INTRALESIONAL; INTRAMUSCULAR; SOFT TISSUE ONCE
Status: COMPLETED | OUTPATIENT
Start: 2024-01-15 | End: 2024-01-15

## 2024-01-15 RX ADMIN — BETAMETHASONE SODIUM PHOSPHATE AND BETAMETHASONE ACETATE 6 MG: 3; 3 INJECTION, SUSPENSION INTRA-ARTICULAR; INTRALESIONAL; INTRAMUSCULAR; SOFT TISSUE at 09:07

## 2024-01-15 NOTE — PROGRESS NOTES
Orthopaedic Hand Surgery Note    Name: Yashira Rand  YOB: 1965  Gender: female  MRN: [id number]    CC: Follow up for trigger finger    HPI: Patient is a 58 y.o. female who returns today for reevaluation of a left middle trigger finger. Last visit we provided cortisone injection which worked for several months.  She had a right middle finger trigger finger release performed in 2015.  She said a stitch from the surgery finally came out over the holidays.  She was doing a lot of lifting groceries and cutting vegetables, and the knuckle of the middle finger still remains somewhat sore.  There is no locking present.    ROS/Meds/PSH/PMH/FH/SH: I personally reviewed the patients standard intake form.  Pertinents are discussed in the HPI    Physical Examination:  Musculoskeletal:   Examination on the left  upper extremity demonstrates, normal sensation and good cap refill in all fingers, negative tenderness over the middle A1 pulley with palpable clicking and positive  locking.     On exam of the right upper extremity, there is a well-healed surgical incision consistent with a middle finger trigger finger release, no tenderness to palpation or erythema here.  There is no triggering present.  She has mild tenderness to palpation at the metacarpophalangeal joint of the middle finger, no crepitus with range of motion, mild pain with range of motion    Imaging / Electrodiagnostic Tests:     none    Assessment:     ICD-10-CM    1. Trigger middle finger of left hand  M65.332       2. Right hand pain  M79.641           Plan:  We discussed the diagnosis and different treatment options. We discussed observation, splinting, cortisone injections and surgical release of the A1 pulley. We discussed that stenosing tenosynovitis at the level of the A1 pulley AKA trigger finger is a chronic condition regardless of how long the symptoms have been present, this most likely has been progressing for much longer than the

## 2024-02-06 ENCOUNTER — NURSE ONLY (OUTPATIENT)
Dept: FAMILY MEDICINE CLINIC | Facility: CLINIC | Age: 59
End: 2024-02-06

## 2024-02-06 DIAGNOSIS — E78.5 DYSLIPIDEMIA: ICD-10-CM

## 2024-02-06 DIAGNOSIS — I10 ESSENTIAL HYPERTENSION: ICD-10-CM

## 2024-02-06 LAB
ALBUMIN SERPL-MCNC: 4 G/DL (ref 3.5–5)
ALBUMIN/GLOB SERPL: 1.2 (ref 0.4–1.6)
ALP SERPL-CCNC: 98 U/L (ref 50–136)
ALT SERPL-CCNC: 24 U/L (ref 12–65)
ANION GAP SERPL CALC-SCNC: 4 MMOL/L (ref 2–11)
AST SERPL-CCNC: 12 U/L (ref 15–37)
BILIRUB SERPL-MCNC: 0.3 MG/DL (ref 0.2–1.1)
BUN SERPL-MCNC: 22 MG/DL (ref 6–23)
CALCIUM SERPL-MCNC: 9.8 MG/DL (ref 8.3–10.4)
CHLORIDE SERPL-SCNC: 112 MMOL/L (ref 103–113)
CHOLEST SERPL-MCNC: 228 MG/DL
CO2 SERPL-SCNC: 27 MMOL/L (ref 21–32)
CREAT SERPL-MCNC: 1 MG/DL (ref 0.6–1)
GLOBULIN SER CALC-MCNC: 3.3 G/DL (ref 2.8–4.5)
GLUCOSE SERPL-MCNC: 92 MG/DL (ref 65–100)
HDLC SERPL-MCNC: 63 MG/DL (ref 40–60)
HDLC SERPL: 3.6
LDLC SERPL CALC-MCNC: 154.2 MG/DL
POTASSIUM SERPL-SCNC: 4 MMOL/L (ref 3.5–5.1)
PROT SERPL-MCNC: 7.3 G/DL (ref 6.3–8.2)
SODIUM SERPL-SCNC: 143 MMOL/L (ref 136–146)
TRIGL SERPL-MCNC: 54 MG/DL (ref 35–150)
VLDLC SERPL CALC-MCNC: 10.8 MG/DL (ref 6–23)

## 2024-02-09 NOTE — PROGRESS NOTES
Yashira Rand (: 1965) is a 58 y.o. female, an established patient, is here for evaluation of the following chief complaint(s):  Chief Complaint   Patient presents with    Hypertension    Migraine    Results        ASSESSMENT/PLAN:  Yashira was seen today for hypertension, migraine and results.    Diagnoses and all orders for this visit:    Essential hypertension  -     indapamide (LOZOL) 2.5 MG tablet; Take 1 tablet by mouth daily  -     lisinopril-hydroCHLOROthiazide (PRINZIDE;ZESTORETIC) 10-12.5 MG per tablet; Take 1 tablet by mouth daily  -     Comprehensive Metabolic Panel; Future    Dyslipidemia  -     ezetimibe-simvastatin (VYTORIN) 10-20 MG per tablet; Take 1 tablet by mouth nightly  -     Comprehensive Metabolic Panel; Future  -     Lipid Panel; Future    Migraine with aura and without status migrainosus, not intractable  -     Erenumab-aooe (AIMOVIG) 140 MG/ML SOAJ; Inject 140 mg into the skin every 30 days    Hypokalemia  -     potassium chloride (KLOR-CON M) 20 MEQ extended release tablet; Take 1 tablet by mouth 2 times daily  -     Comprehensive Metabolic Panel; Future    Cervicalgia  -     tiZANidine (ZANAFLEX) 4 MG tablet; Takes 1 tablet at bedtime    Chronic left-sided low back pain without sciatica  -     tiZANidine (ZANAFLEX) 4 MG tablet; Takes 1 tablet at bedtime    Tinea corporis  -     nystatin-triamcinolone (MYCOLOG II) 936884-5.1 UNIT/GM-% cream; Apply topically under belly once weekly as needed    Chronic diarrhea  -     colestipol (COLESTID) 1 g tablet; Take 1 tablet by mouth 2 times daily    Anxiety  -     LORazepam (ATIVAN) 1 MG tablet; Take 1 tablet by mouth daily as needed for Anxiety for up to 180 days. (Usually needs 2-3x/month) Max Daily Amount: 1 mg          I reviewed recent lab results w/  Ms. Rand.      The 10-year ASCVD risk score (Kayla MICHAUD, et al., 2019) is: 2.6%    Values used to calculate the score:      Age: 58 years      Sex: Female      Is Non-

## 2024-02-12 ENCOUNTER — OFFICE VISIT (OUTPATIENT)
Dept: FAMILY MEDICINE CLINIC | Facility: CLINIC | Age: 59
End: 2024-02-12
Payer: COMMERCIAL

## 2024-02-12 VITALS
BODY MASS INDEX: 34.2 KG/M2 | OXYGEN SATURATION: 99 % | SYSTOLIC BLOOD PRESSURE: 120 MMHG | HEART RATE: 68 BPM | WEIGHT: 193 LBS | HEIGHT: 63 IN | RESPIRATION RATE: 16 BRPM | DIASTOLIC BLOOD PRESSURE: 80 MMHG | TEMPERATURE: 98.8 F

## 2024-02-12 DIAGNOSIS — B35.4 TINEA CORPORIS: ICD-10-CM

## 2024-02-12 DIAGNOSIS — M54.2 CERVICALGIA: ICD-10-CM

## 2024-02-12 DIAGNOSIS — M54.50 CHRONIC LEFT-SIDED LOW BACK PAIN WITHOUT SCIATICA: ICD-10-CM

## 2024-02-12 DIAGNOSIS — G89.29 CHRONIC LEFT-SIDED LOW BACK PAIN WITHOUT SCIATICA: ICD-10-CM

## 2024-02-12 DIAGNOSIS — I10 ESSENTIAL HYPERTENSION: ICD-10-CM

## 2024-02-12 DIAGNOSIS — G43.109 MIGRAINE WITH AURA AND WITHOUT STATUS MIGRAINOSUS, NOT INTRACTABLE: ICD-10-CM

## 2024-02-12 DIAGNOSIS — F41.9 ANXIETY: ICD-10-CM

## 2024-02-12 DIAGNOSIS — E78.5 DYSLIPIDEMIA: ICD-10-CM

## 2024-02-12 DIAGNOSIS — E87.6 HYPOKALEMIA: ICD-10-CM

## 2024-02-12 DIAGNOSIS — K52.9 CHRONIC DIARRHEA: ICD-10-CM

## 2024-02-12 PROBLEM — Z90.3 HISTORY OF SLEEVE GASTRECTOMY: Status: ACTIVE | Noted: 2024-02-12

## 2024-02-12 PROBLEM — Q27.33 ARTERIOVENOUS MALFORMATION OF DIGESTIVE SYSTEM VESSEL (CODE): Status: ACTIVE | Noted: 2024-02-12

## 2024-02-12 PROCEDURE — 99214 OFFICE O/P EST MOD 30 MIN: CPT | Performed by: PHYSICIAN ASSISTANT

## 2024-02-12 PROCEDURE — 3074F SYST BP LT 130 MM HG: CPT | Performed by: PHYSICIAN ASSISTANT

## 2024-02-12 PROCEDURE — 3079F DIAST BP 80-89 MM HG: CPT | Performed by: PHYSICIAN ASSISTANT

## 2024-02-12 RX ORDER — AMPICILLIN TRIHYDRATE 250 MG
CAPSULE ORAL
COMMUNITY

## 2024-02-12 RX ORDER — LISINOPRIL AND HYDROCHLOROTHIAZIDE 12.5; 1 MG/1; MG/1
1 TABLET ORAL DAILY
Qty: 90 TABLET | Refills: 1 | Status: SHIPPED | OUTPATIENT
Start: 2024-02-12

## 2024-02-12 RX ORDER — INDAPAMIDE 2.5 MG/1
2.5 TABLET ORAL DAILY
Qty: 90 TABLET | Refills: 3 | Status: SHIPPED | OUTPATIENT
Start: 2024-02-12

## 2024-02-12 RX ORDER — TIZANIDINE 4 MG/1
TABLET ORAL
Qty: 90 TABLET | Refills: 1 | Status: SHIPPED | OUTPATIENT
Start: 2024-02-12

## 2024-02-12 RX ORDER — CYANOCOBALAMIN (VITAMIN B-12) 500 MCG
LOZENGE ORAL
COMMUNITY

## 2024-02-12 RX ORDER — LORAZEPAM 1 MG/1
TABLET ORAL
COMMUNITY
End: 2024-02-12 | Stop reason: SDUPTHER

## 2024-02-12 RX ORDER — MONTELUKAST SODIUM 4 MG/1
1 TABLET, CHEWABLE ORAL 2 TIMES DAILY
Qty: 180 TABLET | Refills: 3 | Status: SHIPPED | OUTPATIENT
Start: 2024-02-12

## 2024-02-12 RX ORDER — ERENUMAB-AOOE 140 MG/ML
140 INJECTION, SOLUTION SUBCUTANEOUS
Qty: 3 ADJUSTABLE DOSE PRE-FILLED PEN SYRINGE | Refills: 4 | Status: SHIPPED | OUTPATIENT
Start: 2024-02-12

## 2024-02-12 RX ORDER — LORAZEPAM 1 MG/1
1 TABLET ORAL DAILY PRN
Qty: 10 TABLET | Refills: 5 | Status: SHIPPED | OUTPATIENT
Start: 2024-02-12 | End: 2024-08-10

## 2024-02-12 RX ORDER — BIOTIN 1 MG
TABLET ORAL
COMMUNITY

## 2024-02-12 RX ORDER — POTASSIUM CHLORIDE 20 MEQ/1
20 TABLET, EXTENDED RELEASE ORAL 2 TIMES DAILY
Qty: 180 TABLET | Refills: 1 | Status: SHIPPED | OUTPATIENT
Start: 2024-02-12

## 2024-02-12 RX ORDER — EZETIMIBE AND SIMVASTATIN 10; 20 MG/1; MG/1
1 TABLET ORAL NIGHTLY
Qty: 90 TABLET | Refills: 1 | Status: SHIPPED | OUTPATIENT
Start: 2024-02-12

## 2024-04-29 ENCOUNTER — OFFICE VISIT (OUTPATIENT)
Dept: NEUROLOGY | Age: 59
End: 2024-04-29
Payer: COMMERCIAL

## 2024-04-29 VITALS
HEART RATE: 70 BPM | SYSTOLIC BLOOD PRESSURE: 136 MMHG | HEIGHT: 63 IN | WEIGHT: 195 LBS | BODY MASS INDEX: 34.55 KG/M2 | OXYGEN SATURATION: 99 % | DIASTOLIC BLOOD PRESSURE: 81 MMHG

## 2024-04-29 DIAGNOSIS — Z71.9 HEALTH EDUCATION/COUNSELING: ICD-10-CM

## 2024-04-29 DIAGNOSIS — G43.109 MIGRAINE WITH AURA AND WITHOUT STATUS MIGRAINOSUS, NOT INTRACTABLE: Primary | ICD-10-CM

## 2024-04-29 DIAGNOSIS — M47.812 CERVICAL SPONDYLOSIS WITHOUT MYELOPATHY: ICD-10-CM

## 2024-04-29 DIAGNOSIS — Z79.899 MEDICATION MANAGEMENT: ICD-10-CM

## 2024-04-29 PROCEDURE — 99214 OFFICE O/P EST MOD 30 MIN: CPT | Performed by: PSYCHIATRY & NEUROLOGY

## 2024-04-29 PROCEDURE — 3075F SYST BP GE 130 - 139MM HG: CPT | Performed by: PSYCHIATRY & NEUROLOGY

## 2024-04-29 PROCEDURE — 3079F DIAST BP 80-89 MM HG: CPT | Performed by: PSYCHIATRY & NEUROLOGY

## 2024-04-29 ASSESSMENT — ENCOUNTER SYMPTOMS
ABDOMINAL DISTENTION: 0
SORE THROAT: 0
EYE DISCHARGE: 0
RHINORRHEA: 0
SHORTNESS OF BREATH: 0
COUGH: 0

## 2024-04-29 NOTE — PATIENT INSTRUCTIONS
Headache counseling:  - Instructed patient to take the abortive medications once the headache starts.  - Educated patient on as-needed use of over-the-counter NSAID's including Acetaminophen (Tylenol), Ibuprofen (Advil), Naproxen (Aleve), limiting their use to a maximum of 3 days per week to avoid medication overuse headache. Avoid taking Excedrin, caffeine-containing medications and Fioricet.  - Diet: Eat 3 regular meals per day. Do not skip meals or eat very late at night. Avoid caffeine.?- Hydration: It is very important to stay well hydrated to avoid triggering a headache.   - Emphasize importance of sleep hygiene. Go to bed at the same time each day. Get at least 8 hours of sleep each night. Avoid cell phones/TV or other bright screens in the 1-2 hours prior to bed time. Do not sleep with your cell phone in bed to avoid interrupted sleep. Get up near the same time each day.  - Check for certain food triggers - consider avoiding artificial sweeteners, food preservative (MSG), aged cheese, alcohol or chocolate.  - Lifestyle changes to help headaches: moderate physical exercise, fixed meal times, maintain regular sleep cycle, avoid smoking  - Limit or avoid caffeinated beverages (sodas, tea, coffee, energy drinks)  - Keep a headache diary or use an he to reveal triggers and track headache patterns.

## 2024-04-29 NOTE — PROGRESS NOTES
[facility] NEUROLOGY FOLLOW-UP NOTE    Patient: Yashira Rand  Physician: Vilma Guevara MD    CC:   Chief Complaint   Patient presents with    Follow-up       PCP: Lorna Li PA  Referring Provider: Malachi Yates MD    History of Present Illness:     Interval History on 4/29/2024:  Yashira Rand is a 59 y.o. right-handed female who presents for follow-up management of chronic migraine headaches.  Patient is doing well on combination of Aimovig 140mg as a preventive therapy and Nurtec as needed for severe migraine attacks.  Current headache frequency is 1 episode per month.  Her episodes are usually lasting for 3-5 days.  She usually only takes Nurtec once in 72 hours. Typical headache is associated seeing spots (visual aura) , holocranial pounding pain, nausea, light noise sensitivity.  She denies any change in her headache character.  She denies any new symptoms since last visit.  She continues to see pain medicine and getting injections in her neck for chronic neck pain. She drinks a significant amount of caffeine throughout the day, approximately 3-4 shots of espresso daily.  Does not take any over-the-counter pain medicine for her headaches      Review of Systems:   A comprehensive 10-point ROS was obtained and was negative other than noted in the HPI.  Review of Systems   Constitutional:  Negative for chills and fever.   HENT:  Negative for rhinorrhea and sore throat.    Eyes:  Negative for discharge.   Respiratory:  Negative for cough and shortness of breath.    Cardiovascular:  Negative for chest pain.   Gastrointestinal:  Negative for abdominal distention.   Genitourinary:  Negative for dysuria.   Skin:  Negative for pallor and rash.   Psychiatric/Behavioral:  Negative for agitation and hallucinations.         Lab/Imaging Review:   I REVIEWED PERTINENT LABS, IMAGES, AND REPORTS WITH THE PATIENT PERSONALLY, DIRECTLY AND FULLY. THE MOST PERTINENT FINDINGS ARE NOTED

## 2024-05-01 ENCOUNTER — TELEPHONE (OUTPATIENT)
Dept: NEUROLOGY | Age: 59
End: 2024-05-01

## 2024-05-01 DIAGNOSIS — G43.109 MIGRAINE WITH AURA AND WITHOUT STATUS MIGRAINOSUS, NOT INTRACTABLE: Primary | ICD-10-CM

## 2024-05-01 DIAGNOSIS — Z79.899 MEDICATION MANAGEMENT: ICD-10-CM

## 2024-05-07 ENCOUNTER — TELEPHONE (OUTPATIENT)
Dept: NEUROLOGY | Age: 59
End: 2024-05-07

## 2024-07-25 ENCOUNTER — OFFICE VISIT (OUTPATIENT)
Age: 59
End: 2024-07-25
Payer: COMMERCIAL

## 2024-07-25 VITALS — HEIGHT: 63 IN | BODY MASS INDEX: 33.49 KG/M2 | WEIGHT: 189 LBS

## 2024-07-25 DIAGNOSIS — M65.332 TRIGGER MIDDLE FINGER OF LEFT HAND: Primary | ICD-10-CM

## 2024-07-25 PROCEDURE — 99214 OFFICE O/P EST MOD 30 MIN: CPT | Performed by: ORTHOPAEDIC SURGERY

## 2024-07-25 NOTE — H&P (VIEW-ONLY)
Orthopaedic Hand Surgery Note    Name: Yashira Rand  YOB: 1965  Gender: female  MRN: [id number]    CC: Follow up for trigger finger    HPI: Patient is a 59 y.o. female who returns today for reevaluation of a left middle trigger finger. Last visit we provided cortisone injection.    ROS/Meds/PSH/PMH/FH/SH: I personally reviewed the patients standard intake form.  Pertinents are discussed in the HPI    Physical Examination:  Musculoskeletal:   Examination on the left  upper extremity demonstrates, normal sensation and good cap refill in all fingers, positive tenderness over the middle A1 pulley with palpable clicking and positive  locking.    Imaging / Electrodiagnostic Tests:     none    Assessment:     ICD-10-CM    1. Trigger middle finger of left hand  M65.332 Case Request          Plan:  We discussed the diagnosis and different treatment options. We discussed observation, splinting, cortisone injections and surgical release of the A1 pulley. We discussed that stenosing tenosynovitis at the level of the A1 pulley AKA trigger finger is a chronic condition regardless of how long the symptoms have been present, this most likely has been progressing for much longer than the symptoms were evident and it will likely persist for a long time without medical treatment. Furthermore, many patients require surgical release at some point despite some short-term benefits of conservative treatment.  After discussing in detail the patient elects to proceed with surgical treatment; she has opted for open TFR    Patient understands risks and benefits of left middle trigger finger release including but not limited to nerve injury, vessel injury, infection, failure to achieve desired results and possible need for additional surgery. Patient understands and wishes to proceed with surgery.     On Exam:   The patient is alert and oriented  Cardiovascular: regular rate and rhythm  Respiratory: Non labored  No

## 2024-07-25 NOTE — PROGRESS NOTES
breathing  .     Patient voiced accordance and understanding of the information provided and the formulated plan. All questions were answered to the patient's satisfaction during the encounter.        Shanda Carcamo MD  Orthopaedic Surgery  07/25/24  6:59 PM

## 2024-07-26 ENCOUNTER — TELEPHONE (OUTPATIENT)
Dept: ORTHOPEDIC SURGERY | Age: 59
End: 2024-07-26

## 2024-07-26 RX ORDER — CYCLOBENZAPRINE HCL 10 MG
TABLET ORAL 3 TIMES DAILY PRN
COMMUNITY
Start: 2024-06-06 | End: 2024-12-02

## 2024-07-26 NOTE — PERIOP NOTE
Patient verified name and .  Order for consent NOT found in EHR at time of PAT visit. Unable to verify case posting against order; surgery verified by patient.    Type 1A surgery, phone assessment complete.  Orders  received.  Labs per surgeon: none ordered  Labs per anesthesia protocol: not indicated    Patient answered medical/surgical history questions at their best of ability. All prior to admission medications documented in EPIC.    Patient instructed to continue taking all prescription medications up to the day of surgery but to take only the following medications the day of surgery according to anesthesia guidelines with a small sip of water:  lorazepam if needed, zonegran, omeprazole.    Patient informed that all vitamins and supplements should be held 7 days prior to surgery and NSAIDS (preventative aspirin 81 mg, 5 days prior to surgery. Prescription meds to hold: Aimovig hold now for surgery    Patient instructed on the following:    > Arrive at [facility] Entr[facility], time of arrival to be called the day before by 1700  > NPO after midnight, unless otherwise indicated, including gum, mints, and ice chips  > Responsible adult must drive patient to the hospital, stay during surgery, and patient will need supervision 24 hours after anesthesia  > Use non moisturizing soap in shower the night before surgery and on the morning of surgery  > All piercings must be removed prior to arrival.    > Leave all valuables (money and jewelry) at home but bring insurance card and ID on DOS.   > You may be required to pay a deductible or co-pay on the day of your procedure. You can pre-pay by calling [phone] if your surgery is at the [facility] or [phone] if your surgery is at the [facility].  > Do not wear make-up, nail polish, lotions, cologne, perfumes, powders, or oil on skin. Artificial nails are not permitted.

## 2024-07-30 NOTE — PERIOP NOTE
Preop department called to notify patient of arrival time for scheduled procedure. Instructions given to   - Arrive at [facility] Entrance [address].  - Remain NPO after midnight, unless otherwise indicated, including gum, mints, and ice chips.   - Have a responsible adult to drive patient to the hospital, stay during surgery, and patient will need supervision 24 hours after anesthesia.   - Use antibacterial soap in shower the night before surgery and on the morning of surgery.       Was patient contacted: yes  Voicemail left:   Numbers contacted: [phone]   Arrival time: 0700

## 2024-07-31 ENCOUNTER — ANESTHESIA (OUTPATIENT)
Dept: SURGERY | Age: 59
End: 2024-07-31
Payer: MEDICARE

## 2024-07-31 ENCOUNTER — HOSPITAL ENCOUNTER (OUTPATIENT)
Age: 59
Setting detail: OUTPATIENT SURGERY
Discharge: HOME | End: 2024-07-31
Attending: ORTHOPAEDIC SURGERY | Admitting: ORTHOPAEDIC SURGERY
Payer: MEDICARE

## 2024-07-31 ENCOUNTER — ANESTHESIA EVENT (OUTPATIENT)
Dept: SURGERY | Age: 59
End: 2024-07-31
Payer: MEDICARE

## 2024-07-31 VITALS
SYSTOLIC BLOOD PRESSURE: 131 MMHG | HEIGHT: 63 IN | WEIGHT: 189 LBS | DIASTOLIC BLOOD PRESSURE: 63 MMHG | OXYGEN SATURATION: 95 % | RESPIRATION RATE: 15 BRPM | BODY MASS INDEX: 33.49 KG/M2 | TEMPERATURE: 97.3 F | HEART RATE: 53 BPM

## 2024-07-31 DIAGNOSIS — M65.332 TRIGGER MIDDLE FINGER OF LEFT HAND: Primary | ICD-10-CM

## 2024-07-31 LAB — POTASSIUM BLD-SCNC: 3.8 MMOL/L (ref 3.5–5.1)

## 2024-07-31 PROCEDURE — 3700000000 HC ANESTHESIA ATTENDED CARE: Performed by: ORTHOPAEDIC SURGERY

## 2024-07-31 PROCEDURE — 6360000002 HC RX W HCPCS: Performed by: ORTHOPAEDIC SURGERY

## 2024-07-31 PROCEDURE — 2720000010 HC SURG SUPPLY STERILE: Performed by: ORTHOPAEDIC SURGERY

## 2024-07-31 PROCEDURE — 7100000000 HC PACU RECOVERY - FIRST 15 MIN: Performed by: ORTHOPAEDIC SURGERY

## 2024-07-31 PROCEDURE — 2500000003 HC RX 250 WO HCPCS: Performed by: ORTHOPAEDIC SURGERY

## 2024-07-31 PROCEDURE — 3600000012 HC SURGERY LEVEL 2 ADDTL 15MIN: Performed by: ORTHOPAEDIC SURGERY

## 2024-07-31 PROCEDURE — 3600000002 HC SURGERY LEVEL 2 BASE: Performed by: ORTHOPAEDIC SURGERY

## 2024-07-31 PROCEDURE — 2709999900 HC NON-CHARGEABLE SUPPLY: Performed by: ORTHOPAEDIC SURGERY

## 2024-07-31 PROCEDURE — 7100000010 HC PHASE II RECOVERY - FIRST 15 MIN: Performed by: ORTHOPAEDIC SURGERY

## 2024-07-31 PROCEDURE — 7100000001 HC PACU RECOVERY - ADDTL 15 MIN: Performed by: ORTHOPAEDIC SURGERY

## 2024-07-31 PROCEDURE — 2580000003 HC RX 258: Performed by: ORTHOPAEDIC SURGERY

## 2024-07-31 PROCEDURE — 6360000002 HC RX W HCPCS

## 2024-07-31 PROCEDURE — 3700000001 HC ADD 15 MINUTES (ANESTHESIA): Performed by: ORTHOPAEDIC SURGERY

## 2024-07-31 PROCEDURE — 84132 ASSAY OF SERUM POTASSIUM: CPT

## 2024-07-31 RX ORDER — SODIUM CHLORIDE 0.9 % (FLUSH) 0.9 %
5-40 SYRINGE (ML) INJECTION PRN
Status: DISCONTINUED | OUTPATIENT
Start: 2024-07-31 | End: 2024-07-31 | Stop reason: HOSPADM

## 2024-07-31 RX ORDER — HYDROCODONE BITARTRATE AND ACETAMINOPHEN 5; 325 MG/1; MG/1
1 TABLET ORAL EVERY 6 HOURS PRN
Qty: 10 TABLET | Refills: 0 | Status: SHIPPED | OUTPATIENT
Start: 2024-07-31 | End: 2024-08-03

## 2024-07-31 RX ORDER — BUPIVACAINE HYDROCHLORIDE 2.5 MG/ML
INJECTION, SOLUTION EPIDURAL; INFILTRATION; INTRACAUDAL PRN
Status: DISCONTINUED | OUTPATIENT
Start: 2024-07-31 | End: 2024-07-31 | Stop reason: ALTCHOICE

## 2024-07-31 RX ORDER — PROPOFOL 10 MG/ML
INJECTION, EMULSION INTRAVENOUS PRN
Status: DISCONTINUED | OUTPATIENT
Start: 2024-07-31 | End: 2024-07-31 | Stop reason: SDUPTHER

## 2024-07-31 RX ORDER — SODIUM CHLORIDE, SODIUM LACTATE, POTASSIUM CHLORIDE, CALCIUM CHLORIDE 600; 310; 30; 20 MG/100ML; MG/100ML; MG/100ML; MG/100ML
INJECTION, SOLUTION INTRAVENOUS CONTINUOUS
Status: DISCONTINUED | OUTPATIENT
Start: 2024-07-31 | End: 2024-07-31 | Stop reason: HOSPADM

## 2024-07-31 RX ORDER — SODIUM CHLORIDE 0.9 % (FLUSH) 0.9 %
5-40 SYRINGE (ML) INJECTION EVERY 12 HOURS SCHEDULED
Status: DISCONTINUED | OUTPATIENT
Start: 2024-07-31 | End: 2024-07-31 | Stop reason: HOSPADM

## 2024-07-31 RX ORDER — LIDOCAINE HYDROCHLORIDE 10 MG/ML
INJECTION, SOLUTION INFILTRATION; PERINEURAL PRN
Status: DISCONTINUED | OUTPATIENT
Start: 2024-07-31 | End: 2024-07-31 | Stop reason: ALTCHOICE

## 2024-07-31 RX ORDER — SODIUM CHLORIDE 9 MG/ML
INJECTION, SOLUTION INTRAVENOUS PRN
Status: DISCONTINUED | OUTPATIENT
Start: 2024-07-31 | End: 2024-07-31 | Stop reason: HOSPADM

## 2024-07-31 RX ADMIN — PROPOFOL 40 MG: 10 INJECTION, EMULSION INTRAVENOUS at 08:54

## 2024-07-31 RX ADMIN — Medication 2000 MG: at 08:53

## 2024-07-31 RX ADMIN — SODIUM CHLORIDE, POTASSIUM CHLORIDE, SODIUM LACTATE AND CALCIUM CHLORIDE: 600; 310; 30; 20 INJECTION, SOLUTION INTRAVENOUS at 07:55

## 2024-07-31 RX ADMIN — PROPOFOL 30 MG: 10 INJECTION, EMULSION INTRAVENOUS at 08:52

## 2024-07-31 ASSESSMENT — PAIN SCALES - GENERAL: PAINLEVEL_OUTOF10: 0

## 2024-07-31 ASSESSMENT — PAIN - FUNCTIONAL ASSESSMENT: PAIN_FUNCTIONAL_ASSESSMENT: 0-10

## 2024-07-31 NOTE — OP NOTE
Hand Surgery Operative Note      Yashira Rand   59 y.o.   female      Pre-op diagnosis: Left middle Trigger Finger   Post op diagnosis: same      Procedure: Left  middle Trigger Finger Release     Surgeon: Shanda Carcamo MD     Anesthesia: Local + MAC     Tourniquet time:   Total Tourniquet Time Documented:  Arm  (Left) - 2 minutes  Total: Arm  (Left) - 2 minutes        Procedure indications: Patient with catching and locking of the above mentioned finger(s) which have been recalcitrant to nonoperative measures. After Thorough discussion, the patient decided to proceed with surgical management. We discussed in detail surgical risks including scar, pain, bleeding, infection, anesthetic risks, neurovascular injury, need for further surgery,  weakness, stiffness, risk of death and potential risk of other unforseen complication.      Procedure description:      Patient was placed in the supine position and after appropriate time-out and side, site and procedure confirmed. Local anesthesia was infiltrated with Lidocaine 2% plain and 0.25% Bupivacaine plain.    A longitudinal incision was made along the middle finger A1 pulley under loupe magnification. Blunt dissection was used to identify the A1 pulley as well as the neurovascular bundle on each side of the flexor tendon. Blunt retraction was used to protect the neurovascular bundles. Sharp incision was made on top of the A1 pulley and scissor dissection was used to extend the sheath incision proximally to release the palmar pulley and distally until the A2 pulley was encountered.      The flexor tendons were then mobilized and not catching or clicking was identified.     Wound was irrigated and hemostasis was obtained with bipolar cautery. Wound then closed with 4-0 nylon and sterile dressing applied.       Disposition: To PACU with no complications and follow up per routine.  Patient is instructed to remove dressings in five days and other precautions

## 2024-07-31 NOTE — ANESTHESIA PRE PROCEDURE
Department of Anesthesiology  Preprocedure Note       Name:  Yashira Rand   Age:  59 y.o.  :  1965                                          MRN:  [id number]         Date:  2024      Surgeon: Surgeon(s):  Shanda Carcamo MD    Procedure: Procedure(s):  Left middle trigger finger release    Medications prior to admission:   Prior to Admission medications    Medication Sig Start Date End Date Taking? Authorizing Provider   cyclobenzaprine (FLEXERIL) 10 MG tablet 3 times daily as needed 24 Yes ProviderAlda MD   rimegepant sulfate 75 MG TBDP Take 75 mg by mouth as needed (severe migraine attacks) 24   Vilma Guevara MD   Biotin 1000 MCG TABS 0 Tablet    Alda Mendoza MD   Vitamin E 400 units TABS capsule    Alda Mendoza MD   Cinnamon 500 MG CAPS 0 Capsule    Alda Mendoza MD   colestipol (COLESTID) 1 g tablet Take 1 tablet by mouth 2 times daily 24   Lorna Li PA   nystatin-triamcinolone (MYCOLOG II) 796348-7.1 UNIT/GM-% cream Apply topically under belly once weekly as needed 24   Lorna Li PA   Erenumab-aooe (AIMOVIG) 140 MG/ML SOAJ Inject 140 mg into the skin every 30 days  Patient taking differently: Inject 140 mg into the skin every 30 days Takes 1st of the month 24   Lorna Li PA   ezetimibe-simvastatin (VYTORIN) 10-20 MG per tablet Take 1 tablet by mouth nightly 24   Lorna Li PA   indapamide (LOZOL) 2.5 MG tablet Take 1 tablet by mouth daily 24   Lorna Li PA   lisinopril-hydroCHLOROthiazide (PRINZIDE;ZESTORETIC) 10-12.5 MG per tablet Take 1 tablet by mouth daily 24   Lorna Li PA   potassium chloride (KLOR-CON M) 20 MEQ extended release tablet Take 1 tablet by mouth 2 times daily 24   Lorna Li PA   tiZANidine (ZANAFLEX) 4 MG tablet Takes 1 tablet at bedtime 24   Lorna Li PA

## 2024-07-31 NOTE — PROGRESS NOTES
Spiritual Consult for Pre-Surgery Prayer. PT was in bed preparing for surgery. Mother was at bedside.  PT expressed importance of and comfort in jacquie and prayer. PT is Taoist. CH offered prayer. CH offered additional support if needed.    Rev. Daisy Kelley M.Div.

## 2024-07-31 NOTE — ANESTHESIA POSTPROCEDURE EVALUATION
Department of Anesthesiology  Postprocedure Note    Patient: aYshira Rand  MRN: [id number]  YOB: 1965  Date of evaluation: 7/31/2024    Procedure Summary       Date: 07/31/24 Room / Location: [facility] OP OR 07 / [facility]    Anesthesia Start: 0848 Anesthesia Stop: 0911    Procedure: Left middle trigger finger release (Left: Hand) Diagnosis:       Trigger middle finger of left hand      (Trigger middle finger of left hand [M65.332])    Surgeons: Shanda Carcamo MD Responsible Provider: Isrrael Coley MD    Anesthesia Type: TIVA ASA Status: 3            Anesthesia Type: No value filed.    Dee Phase I: Dee Score: 10    Dee Phase II: Dee Score: 10    Anesthesia Post Evaluation    Patient location during evaluation: PACU  Patient participation: complete - patient participated  Level of consciousness: awake and alert  Airway patency: patent  Nausea & Vomiting: no nausea and no vomiting  Cardiovascular status: hemodynamically stable  Respiratory status: acceptable  Hydration status: euvolemic  Multimodal analgesia pain management approach  Pain management: adequate    No notable events documented.

## 2024-08-15 ENCOUNTER — OFFICE VISIT (OUTPATIENT)
Age: 59
End: 2024-08-15

## 2024-08-15 DIAGNOSIS — M65.332 TRIGGER MIDDLE FINGER OF LEFT HAND: Primary | ICD-10-CM

## 2024-08-15 PROCEDURE — 99024 POSTOP FOLLOW-UP VISIT: CPT | Performed by: NURSE PRACTITIONER

## 2024-08-15 NOTE — PROGRESS NOTES
Orthopaedic Hand Surgery Note    Name: Yashira Rand  YOB: 1965  Gender: female  MRN: [id number]    HPI: Patient is status post Left middle trigger finger release - Left on 7/31/2024. Patient reports pain is improved, no finger locking. No fevers or chills.    Physical Examination:  Wound healing well.  Sutures are intact with no erythema or drainage.  Good finger and wrist range of motion. Pain is improved from preoperative.  Patient is not able to make a full composite fist with the middle finger.    Assessment:     ICD-10-CM    1. Trigger middle finger of left hand  M65.332           Status post Left middle trigger finger release - Left on 7/31/2024    Plan:  We will remove her sutures.  She can progress to activities as tolerated.  She wants to try home exercises.  We will provide her with those.  If in 1 week she feels that she is not making good progress she will contact our office and we will put her in formal therapy.  We will see her back as needed.      Amada Singh NP  Orthopaedic Surgery  08/15/24  9:13 AM

## 2024-08-21 NOTE — PROGRESS NOTES
Yashira Rand (: 1965) is a 59 y.o. female, an established patient, is here for evaluation of the following chief complaint(s):  Chief Complaint   Patient presents with    Anxiety    Migraine    Hypertension    Results    Hand surgery        ASSESSMENT/PLAN:  Yashira was seen today for anxiety, migraine, hypertension, results and hand surgery.    Diagnoses and all orders for this visit:    Essential hypertension  -     lisinopril-hydroCHLOROthiazide (PRINZIDE;ZESTORETIC) 10-12.5 MG per tablet; Take 1 tablet by mouth daily  -     Basic Metabolic Panel; Future    Dyslipidemia  -     ezetimibe-simvastatin (VYTORIN) 10-20 MG per tablet; Take 1 tablet by mouth nightly    Migraine with aura and without status migrainosus, not intractable    Cervicalgia    Hypokalemia  -     potassium chloride (KLOR-CON M) 20 MEQ extended release tablet; Take 1 tablet by mouth 2 times daily  -     Basic Metabolic Panel; Future    Severe obesity (BMI 35.0-39.9) with comorbidity (HCC)    Nausea  -     promethazine (PHENERGAN) 25 MG tablet; Take 1 tablet by mouth every 8 hours as needed for Nausea      I reviewed recent lab results w/  Ms. Rand.      Lipids are well-controlled.  The 10-year ASCVD risk score (Kayla DK, et al., 2019) is: 2.1%    Values used to calculate the score:      Age: 59 years      Sex: Female      Is Non- : No      Diabetic: No      Tobacco smoker: No      Systolic Blood Pressure: 118 mmHg      Is BP treated: Yes      HDL Cholesterol: 58 MG/DL      Total Cholesterol: 156 MG/DL  Currently prescribed:   Lipid Lowering Medications       Bile Acid Sequestrants       colestipol (COLESTID) 1 g tablet Take 1 tablet by mouth 2 times daily       Antihyperlipidemics - Combinations       ezetimibe-simvastatin (VYTORIN) 10-20 MG per tablet Take 1 tablet by mouth nightly     BP today is: WNL  Currently prescribed:   Hypertension Medications       Antihypertensive Combinations

## 2024-08-22 ENCOUNTER — OFFICE VISIT (OUTPATIENT)
Dept: FAMILY MEDICINE CLINIC | Facility: CLINIC | Age: 59
End: 2024-08-22

## 2024-08-22 VITALS
TEMPERATURE: 98.8 F | OXYGEN SATURATION: 99 % | DIASTOLIC BLOOD PRESSURE: 84 MMHG | BODY MASS INDEX: 35.83 KG/M2 | SYSTOLIC BLOOD PRESSURE: 118 MMHG | RESPIRATION RATE: 16 BRPM | HEART RATE: 64 BPM | WEIGHT: 202.2 LBS | HEIGHT: 63 IN

## 2024-08-22 DIAGNOSIS — R11.0 NAUSEA: ICD-10-CM

## 2024-08-22 DIAGNOSIS — E78.5 DYSLIPIDEMIA: ICD-10-CM

## 2024-08-22 DIAGNOSIS — I10 ESSENTIAL HYPERTENSION: Primary | ICD-10-CM

## 2024-08-22 DIAGNOSIS — E87.6 HYPOKALEMIA: ICD-10-CM

## 2024-08-22 DIAGNOSIS — G43.109 MIGRAINE WITH AURA AND WITHOUT STATUS MIGRAINOSUS, NOT INTRACTABLE: ICD-10-CM

## 2024-08-22 DIAGNOSIS — E66.01 SEVERE OBESITY (BMI 35.0-39.9) WITH COMORBIDITY (HCC): ICD-10-CM

## 2024-08-22 DIAGNOSIS — M54.2 CERVICALGIA: ICD-10-CM

## 2024-08-22 RX ORDER — LISINOPRIL AND HYDROCHLOROTHIAZIDE 12.5; 1 MG/1; MG/1
1 TABLET ORAL DAILY
Qty: 90 TABLET | Refills: 1 | Status: SHIPPED | OUTPATIENT
Start: 2024-08-22

## 2024-08-22 RX ORDER — POTASSIUM CHLORIDE 20 MEQ/1
20 TABLET, EXTENDED RELEASE ORAL 2 TIMES DAILY
Qty: 180 TABLET | Refills: 1 | Status: SHIPPED | OUTPATIENT
Start: 2024-08-22

## 2024-08-22 RX ORDER — PROMETHAZINE HYDROCHLORIDE 25 MG/1
25 TABLET ORAL EVERY 8 HOURS PRN
Qty: 90 TABLET | Refills: 1 | Status: SHIPPED | OUTPATIENT
Start: 2024-08-22

## 2024-08-22 RX ORDER — EZETIMIBE AND SIMVASTATIN 10; 20 MG/1; MG/1
1 TABLET ORAL NIGHTLY
Qty: 90 TABLET | Refills: 1 | Status: SHIPPED | OUTPATIENT
Start: 2024-08-22

## 2024-08-22 ASSESSMENT — ANXIETY QUESTIONNAIRES
6. BECOMING EASILY ANNOYED OR IRRITABLE: NOT AT ALL
7. FEELING AFRAID AS IF SOMETHING AWFUL MIGHT HAPPEN: NOT AT ALL
1. FEELING NERVOUS, ANXIOUS, OR ON EDGE: NOT AT ALL
3. WORRYING TOO MUCH ABOUT DIFFERENT THINGS: NOT AT ALL
GAD7 TOTAL SCORE: 0
5. BEING SO RESTLESS THAT IT IS HARD TO SIT STILL: NOT AT ALL
4. TROUBLE RELAXING: NOT AT ALL
2. NOT BEING ABLE TO STOP OR CONTROL WORRYING: NOT AT ALL

## 2024-11-13 DIAGNOSIS — G43.109 MIGRAINE WITH AURA AND WITHOUT STATUS MIGRAINOSUS, NOT INTRACTABLE: ICD-10-CM

## 2024-11-13 RX ORDER — ERENUMAB-AOOE 140 MG/ML
140 INJECTION, SOLUTION SUBCUTANEOUS
Qty: 3 ADJUSTABLE DOSE PRE-FILLED PEN SYRINGE | Refills: 3 | Status: SHIPPED | OUTPATIENT
Start: 2024-11-13

## 2024-12-02 ENCOUNTER — HOSPITAL ENCOUNTER (OUTPATIENT)
Dept: MAMMOGRAPHY | Age: 59
Discharge: HOME | End: 2024-12-05
Attending: OBSTETRICS & GYNECOLOGY
Payer: MEDICARE

## 2024-12-02 DIAGNOSIS — Z12.31 ENCOUNTER FOR SCREENING MAMMOGRAM FOR MALIGNANT NEOPLASM OF BREAST: ICD-10-CM

## 2024-12-02 PROCEDURE — 77063 BREAST TOMOSYNTHESIS BI: CPT

## 2024-12-09 SDOH — HEALTH STABILITY: PHYSICAL HEALTH: ON AVERAGE, HOW MANY DAYS PER WEEK DO YOU ENGAGE IN MODERATE TO STRENUOUS EXERCISE (LIKE A BRISK WALK)?: 2 DAYS

## 2024-12-09 SDOH — HEALTH STABILITY: PHYSICAL HEALTH: ON AVERAGE, HOW MANY MINUTES DO YOU ENGAGE IN EXERCISE AT THIS LEVEL?: 10 MIN

## 2024-12-09 ASSESSMENT — PATIENT HEALTH QUESTIONNAIRE - PHQ9
2. FEELING DOWN, DEPRESSED OR HOPELESS: NOT AT ALL
SUM OF ALL RESPONSES TO PHQ QUESTIONS 1-9: 0
SUM OF ALL RESPONSES TO PHQ QUESTIONS 1-9: 0
SUM OF ALL RESPONSES TO PHQ9 QUESTIONS 1 & 2: 0
SUM OF ALL RESPONSES TO PHQ QUESTIONS 1-9: 0
SUM OF ALL RESPONSES TO PHQ QUESTIONS 1-9: 0
1. LITTLE INTEREST OR PLEASURE IN DOING THINGS: NOT AT ALL

## 2024-12-09 ASSESSMENT — LIFESTYLE VARIABLES
HOW MANY STANDARD DRINKS CONTAINING ALCOHOL DO YOU HAVE ON A TYPICAL DAY: PATIENT DOES NOT DRINK
HOW MANY STANDARD DRINKS CONTAINING ALCOHOL DO YOU HAVE ON A TYPICAL DAY: 0
HOW OFTEN DO YOU HAVE A DRINK CONTAINING ALCOHOL: 1
HOW OFTEN DO YOU HAVE A DRINK CONTAINING ALCOHOL: NEVER
HOW OFTEN DO YOU HAVE SIX OR MORE DRINKS ON ONE OCCASION: 1

## 2024-12-13 PROBLEM — M25.552 HIP PAIN, LEFT: Status: RESOLVED | Noted: 2018-02-28 | Resolved: 2024-12-13

## 2024-12-13 PROBLEM — Z79.899 ENCOUNTER FOR MEDICATION MANAGEMENT: Status: RESOLVED | Noted: 2023-04-19 | Resolved: 2024-12-13

## 2024-12-13 NOTE — PROGRESS NOTES
Yashira Rand is 59 y.o. female, , who presents today for a routine annual gynecological examination.No LMP recorded. Patient has had a hysterectomy. . Also s/p BSO ~ Yrs ago.  Doing well.  No Gyn, Breast, G/U, or GI complaints. Not on HRT for several years.    Date of last Mammogram: 2024   - result: Normal  Date of last Cervical Cancer screen (HPV or PAP): 2024     8:00 AM 2022     9:00 AM   Mammogram/Pap Hx   Mammogram Date 2023   Mammogram Result negative WNL   Pap Date 3/28/2017 3/18/2017   Pap Result negative WNL         2024     8:00 AM 2023     8:00 AM   GYN Intake Questionnaire   Current Form of Contraception Hysterectomy Hysterectomy   Date of Mammogram  2023   Result of Mammogram  negative   Date of Pap  3/28/2017   Pap result  negative       Contraception:  hysterectomy  Has received Gardisil: NO  Last Kayenta : Date of last Colonoscopy: 2017   No cologuard on file  No FIT/FOBT on file   No flexible sigmoidoscopy on file  Last time cholesterol was checked: 1 years ago    OB History:   OB History    Para Term  AB Living   0 0 0 0 0 0   SAB IAB Ectopic Molar Multiple Live Births   0 0 0 0 0 0         Health Maintenance Due   Topic Date Due    Orthopox (Smallpox/Monkeypox) Vaccine (1 - Risk 2-dose series) 1965    Hepatitis B vaccine (1 of 3 - 19+ 3-dose series) Never done    Annual Wellness Visit (Medicare Advantage)  2024    Flu vaccine (1) 2024         GYN History            Yashira  has a past medical history of Anxiety, Arthritis, DDD (degenerative disc disease), cervical, Esophageal web, GERD (gastroesophageal reflux disease), Headache, History of chicken pox, History of cystocele, History of measles, History of mumps, Hx of bariatric surgery, Hyperlipidemia, Hypertension, Migraine, Migraines, Sleep apnea, and Symptomatic menopausal or female climacteric states. .    Her surgeries

## 2024-12-13 NOTE — PROGRESS NOTES
Yashira Rand (: 1965) is a 59 y.o. female, an established patient, is here for evaluation of the following chief complaint(s):  Chief Complaint   Patient presents with    Medicare AWV    Hypertension    Hyperlipidemia          ASSESSMENT/PLAN:  Yashira was seen today for medicare awv, hypertension and hyperlipidemia.    Diagnoses and all orders for this visit:    Medicare annual wellness visit, subsequent    Essential hypertension  -     indapamide (LOZOL) 2.5 MG tablet; Take 1 tablet by mouth daily  -     lisinopril-hydroCHLOROthiazide (PRINZIDE;ZESTORETIC) 10-12.5 MG per tablet; Take 1 tablet by mouth daily  -     Comprehensive Metabolic Panel; Future  -     Albumin/Creatinine Ratio, Urine; Future    Dyslipidemia  -     ezetimibe-simvastatin (VYTORIN) 10-20 MG per tablet; Take 1 tablet by mouth nightly  -     Comprehensive Metabolic Panel; Future  -     Lipid Panel; Future    Chronic pain of both shoulders  -     [facility] - [facility] Therapy, [facility]    Chronic diarrhea  -     colestipol (COLESTID) 1 g tablet; Take 1 tablet by mouth 2 times daily    Tinea corporis  -     nystatin-triamcinolone (MYCOLOG II) 721448-8.1 UNIT/GM-% cream; Apply topically under belly once weekly as needed    Hypokalemia  -     potassium chloride (KLOR-CON M) 20 MEQ extended release tablet; Take 1 tablet by mouth 2 times daily    Nausea  -     promethazine (PHENERGAN) 25 MG tablet; Take 1 tablet by mouth every 8 hours as needed for Nausea    Cervicalgia  -     tiZANidine (ZANAFLEX) 4 MG tablet; Takes 1 tablet at bedtime    Chronic left-sided low back pain without sciatica  -     tiZANidine (ZANAFLEX) 4 MG tablet; Takes 1 tablet at bedtime    Anxiety  -     LORazepam (ATIVAN) 1 MG tablet; Take 1 tablet by mouth daily as needed for Anxiety for up to 180 days. (Usually needs 2-3x/month) Max Daily Amount: 1 mg    History of hysterectomy      Will send to lab today for BMP recheck.    BP today is: well

## 2024-12-16 ENCOUNTER — OFFICE VISIT (OUTPATIENT)
Dept: OBGYN CLINIC | Age: 59
End: 2024-12-16
Payer: MEDICARE

## 2024-12-16 VITALS
WEIGHT: 205 LBS | DIASTOLIC BLOOD PRESSURE: 86 MMHG | SYSTOLIC BLOOD PRESSURE: 132 MMHG | BODY MASS INDEX: 36.32 KG/M2 | HEIGHT: 63 IN

## 2024-12-16 DIAGNOSIS — Z01.419 WELL WOMAN EXAM WITH ROUTINE GYNECOLOGICAL EXAM: Primary | ICD-10-CM

## 2024-12-16 PROCEDURE — G0101 CA SCREEN;PELVIC/BREAST EXAM: HCPCS | Performed by: OBSTETRICS & GYNECOLOGY

## 2024-12-16 ASSESSMENT — ENCOUNTER SYMPTOMS
GASTROINTESTINAL NEGATIVE: 1
RESPIRATORY NEGATIVE: 1
ALLERGIC/IMMUNOLOGIC NEGATIVE: 1
EYES NEGATIVE: 1
BLOOD IN STOOL: 0
SHORTNESS OF BREATH: 0
COUGH: 0
ABDOMINAL PAIN: 0

## 2024-12-18 ENCOUNTER — OFFICE VISIT (OUTPATIENT)
Dept: FAMILY MEDICINE CLINIC | Facility: CLINIC | Age: 59
End: 2024-12-18

## 2024-12-18 VITALS
WEIGHT: 203.2 LBS | BODY MASS INDEX: 36 KG/M2 | HEIGHT: 63 IN | OXYGEN SATURATION: 100 % | HEART RATE: 73 BPM | DIASTOLIC BLOOD PRESSURE: 78 MMHG | SYSTOLIC BLOOD PRESSURE: 138 MMHG | RESPIRATION RATE: 16 BRPM | TEMPERATURE: 97.9 F

## 2024-12-18 DIAGNOSIS — R11.0 NAUSEA: ICD-10-CM

## 2024-12-18 DIAGNOSIS — E87.6 HYPOKALEMIA: ICD-10-CM

## 2024-12-18 DIAGNOSIS — M54.50 CHRONIC LEFT-SIDED LOW BACK PAIN WITHOUT SCIATICA: ICD-10-CM

## 2024-12-18 DIAGNOSIS — G89.29 CHRONIC LEFT-SIDED LOW BACK PAIN WITHOUT SCIATICA: ICD-10-CM

## 2024-12-18 DIAGNOSIS — I10 ESSENTIAL HYPERTENSION: ICD-10-CM

## 2024-12-18 DIAGNOSIS — M25.511 CHRONIC PAIN OF BOTH SHOULDERS: ICD-10-CM

## 2024-12-18 DIAGNOSIS — G89.29 CHRONIC PAIN OF BOTH SHOULDERS: ICD-10-CM

## 2024-12-18 DIAGNOSIS — Z00.00 MEDICARE ANNUAL WELLNESS VISIT, SUBSEQUENT: Primary | ICD-10-CM

## 2024-12-18 DIAGNOSIS — M25.512 CHRONIC PAIN OF BOTH SHOULDERS: ICD-10-CM

## 2024-12-18 DIAGNOSIS — F41.9 ANXIETY: ICD-10-CM

## 2024-12-18 DIAGNOSIS — Z90.710 HISTORY OF HYSTERECTOMY: ICD-10-CM

## 2024-12-18 DIAGNOSIS — B35.4 TINEA CORPORIS: ICD-10-CM

## 2024-12-18 DIAGNOSIS — K52.9 CHRONIC DIARRHEA: ICD-10-CM

## 2024-12-18 DIAGNOSIS — M54.2 CERVICALGIA: ICD-10-CM

## 2024-12-18 DIAGNOSIS — E78.5 DYSLIPIDEMIA: ICD-10-CM

## 2024-12-18 LAB
ANION GAP SERPL CALC-SCNC: 14 MMOL/L (ref 7–16)
BUN SERPL-MCNC: 25 MG/DL (ref 6–23)
CALCIUM SERPL-MCNC: 10.5 MG/DL (ref 8.8–10.2)
CHLORIDE SERPL-SCNC: 103 MMOL/L (ref 98–107)
CO2 SERPL-SCNC: 24 MMOL/L (ref 20–29)
CREAT SERPL-MCNC: 1.04 MG/DL (ref 0.6–1.1)
GLUCOSE SERPL-MCNC: 84 MG/DL (ref 70–99)
POTASSIUM SERPL-SCNC: 4.5 MMOL/L (ref 3.5–5.1)
SODIUM SERPL-SCNC: 142 MMOL/L (ref 136–145)

## 2024-12-18 RX ORDER — EZETIMIBE AND SIMVASTATIN 10; 20 MG/1; MG/1
1 TABLET ORAL NIGHTLY
Qty: 90 TABLET | Refills: 1 | Status: SHIPPED | OUTPATIENT
Start: 2024-12-18

## 2024-12-18 RX ORDER — POTASSIUM CHLORIDE 1500 MG/1
20 TABLET, EXTENDED RELEASE ORAL 2 TIMES DAILY
Qty: 180 TABLET | Refills: 1 | Status: SHIPPED | OUTPATIENT
Start: 2024-12-18

## 2024-12-18 RX ORDER — NYSTATIN AND TRIAMCINOLONE ACETONIDE 100000; 1 [USP'U]/G; MG/G
CREAM TOPICAL
Qty: 30 G | Refills: 5 | Status: SHIPPED | OUTPATIENT
Start: 2024-12-18

## 2024-12-18 RX ORDER — LORAZEPAM 1 MG/1
1 TABLET ORAL DAILY PRN
Qty: 10 TABLET | Refills: 5 | Status: SHIPPED | OUTPATIENT
Start: 2024-12-18 | End: 2025-06-16

## 2024-12-18 RX ORDER — PROMETHAZINE HYDROCHLORIDE 25 MG/1
25 TABLET ORAL EVERY 8 HOURS PRN
Qty: 90 TABLET | Refills: 1 | Status: SHIPPED | OUTPATIENT
Start: 2024-12-18

## 2024-12-18 RX ORDER — INDAPAMIDE 2.5 MG/1
2.5 TABLET ORAL DAILY
Qty: 90 TABLET | Refills: 1 | Status: SHIPPED | OUTPATIENT
Start: 2024-12-18

## 2024-12-18 RX ORDER — COLESTIPOL HYDROCHLORIDE 1 G/1
1 TABLET ORAL 2 TIMES DAILY
Qty: 180 TABLET | Refills: 1 | Status: SHIPPED | OUTPATIENT
Start: 2024-12-18

## 2024-12-18 RX ORDER — LISINOPRIL AND HYDROCHLOROTHIAZIDE 10; 12.5 MG/1; MG/1
1 TABLET ORAL DAILY
Qty: 90 TABLET | Refills: 1 | Status: SHIPPED | OUTPATIENT
Start: 2024-12-18

## 2024-12-18 NOTE — PATIENT INSTRUCTIONS
Recommendations:    A preventive eye exam performed by an eye specialist is recommended every 1-2 years to screen for glaucoma; cataracts, macular degeneration, and other eye disorders.  A preventive dental visit is recommended every 6 months.  Try to get at least 150 minutes of exercise per week or 10,000 steps per day on a pedometer .  Order or download the FREE \"Exercise & Physical Activity: Your Everyday Guide\" from [employer] [place] [employer]. Call 1-471.196.4893 or search [employer] [place] [employer] online.  You need 0225-1039 mg of calcium and 8114-6080 IU of vitamin D per day. It is possible to meet your calcium requirement with diet alone, but a vitamin D supplement is usually necessary to meet this goal.  When exposed to the sun, use a sunscreen that protects against both UVA and UVB radiation with an SPF of 30 or greater. Reapply every 2 to 3 hours or after sweating, drying off with a towel, or swimming.  Always wear a seat belt when traveling in a car. Always wear a helmet when riding a bicycle or motorcycle.

## 2025-01-02 ENCOUNTER — HOSPITAL ENCOUNTER (OUTPATIENT)
Dept: PHYSICAL THERAPY | Age: 60
Setting detail: RECURRING SERIES
Discharge: HOME | End: 2025-01-05
Payer: MEDICARE

## 2025-01-02 DIAGNOSIS — M25.511 ACUTE PAIN OF RIGHT SHOULDER: Primary | ICD-10-CM

## 2025-01-02 DIAGNOSIS — M25.512 ACUTE PAIN OF LEFT SHOULDER: ICD-10-CM

## 2025-01-02 DIAGNOSIS — M25.611 STIFFNESS OF RIGHT SHOULDER, NOT ELSEWHERE CLASSIFIED: ICD-10-CM

## 2025-01-02 DIAGNOSIS — M62.81 MUSCLE WEAKNESS (GENERALIZED): ICD-10-CM

## 2025-01-02 PROCEDURE — 97110 THERAPEUTIC EXERCISES: CPT

## 2025-01-02 PROCEDURE — 97162 PT EVAL MOD COMPLEX 30 MIN: CPT

## 2025-01-02 NOTE — THERAPY EVALUATION
Yashira Rand  : 1965  Primary: Shayy Medicare-advantage Ppo (Medicare Managed)  Secondary:  [facility] [place] [address] [address]  [facility] [address]  Phone: [phone]  Fax: [phone] Plan Frequency: 2/week    Plan of Care/Certification Expiration Date: 25        Plan of Care/Certification Expiration Date:  Plan of Care/Certification Expiration Date: 25    Frequency/Duration: Plan Frequency: 2/week      Time In/Out:   Time In: 1145  Time Out: 1229      PT Visit Info:         Visit Count:  1                OUTPATIENT PHYSICAL THERAPY:             Initial Assessment 2025               Episode (B shoulder pain and weakness)         Treatment Diagnosis:     Acute pain of right shoulder  Acute pain of left shoulder  Stiffness of right shoulder, not elsewhere classified  Muscle weakness (generalized)  Medical/Referring Diagnosis:    Chronic pain of both shoulders    Referring Physician:  Lorna Li PA MD Orders:  PT Eval and Treat   Return MD Appt:  TBD  Date of Onset:    2024  Allergies:  Penicillins  Restrictions/Precautions:    None      Medications Last Reviewed: 2025     SUBJECTIVE   History of Injury/Illness (Reason for Referral):  Pt denies any known MATTIE. She reports in July her shoulders started hurting and she noticed weakness when lifting with arm outstretched, especially in abduction. Her R arm hurts worse than L and it has gradually gotten worse over the past several months. She denies numbness or tingling.   Patient Stated Goal(s):  \"gain strength, no pain\"  Initial Pain Level:  3-4/10   Post Session Pain Level: 3-4/10  Past Medical History/Comorbidities:   Ms. Rand  has a past medical history of Anxiety, Arthritis, DDD (degenerative disc disease), cervical, Esophageal web, GERD (gastroesophageal reflux disease), Headache, History of chicken pox, History of cystocele, History of measles, History of mumps, Hx of

## 2025-01-02 NOTE — PROGRESS NOTES
Yashira Rand  : 1965  Primary: Shayy Medicare-advantage Ppo (Medicare Managed)  Secondary:  [facility] [place]  [place] [address]  Phone: [phone]  Fax: [phone] Plan Frequency: 2/week  Plan of Care/Certification Expiration Date: 25        Plan of Care/Certification Expiration Date:  Plan of Care/Certification Expiration Date: 25    Frequency/Duration: Plan Frequency: 2/week      Time In/Out:   Time In: 1145  Time Out: 1229      PT Visit Info:         Visit Count:  1    OUTPATIENT PHYSICAL THERAPY:   Treatment Note 2025       Episode  (B shoulder pain and weakness)               Treatment Diagnosis:    Acute pain of right shoulder  Acute pain of left shoulder  Stiffness of right shoulder, not elsewhere classified  Muscle weakness (generalized)  Medical/Referring Diagnosis:    Chronic pain of both shoulders    Referring Physician:  Lorna Li PA MD Orders:  PT Eval and Treat   Return MD Appt:  TBD   Date of Onset:  2024  Allergies:   Penicillins  Restrictions/Precautions:   None      Interventions Planned (Treatment may consist of any combination of the following):  Current Treatment Recommendations: Strengthening; ROM; Neuromuscular re-education; Manual; Home exercise program; Modalities; Therapeutic activities    See Assessment Note    Subjective Comments:   Pt reports mild aching on R shoulder which she feels constantly now.  Initial Pain Level:: 3-4/10  Post Session Pain Level:  3-4/10  Medications Last Reviewed: 2025  Updated Objective Findings:  see evaluation from 25     Goals: (Goals have been discussed and agreed upon with patient.)  Short-Term Functional Goals: Time Frame: 2 weeks  Pt will confirm compliance with home program to improve strength and ROM.   Discharge Goals: Time Frame: 4 weeks  Pt will be able to reach across body with R hand to touch opposite scapula without pain to perform ADLs

## 2025-01-07 ENCOUNTER — HOSPITAL ENCOUNTER (OUTPATIENT)
Dept: PHYSICAL THERAPY | Age: 60
Setting detail: RECURRING SERIES
Discharge: HOME | End: 2025-01-10
Payer: MEDICARE

## 2025-01-07 PROCEDURE — 97110 THERAPEUTIC EXERCISES: CPT

## 2025-01-07 NOTE — PROGRESS NOTES
Yashira Rand  : 1965  Primary: Shayy Medicare-advantage Ppo (Medicare Managed)  Secondary:  [facility] [place]  [place] [address]  Phone: [phone]  Fax: [phone] Plan Frequency: 2/week  Plan of Care/Certification Expiration Date: 25        Plan of Care/Certification Expiration Date:  Plan of Care/Certification Expiration Date: 25    Frequency/Duration: Plan Frequency: 2/week      Time In/Out:   Time In: 0800  Time Out: 0845      PT Visit Info:         Visit Count:  2    OUTPATIENT PHYSICAL THERAPY:   Treatment Note 2025       Episode  (B shoulder pain and weakness)               Treatment Diagnosis:    Acute pain of right shoulder  Acute pain of left shoulder  Stiffness of right shoulder, not elsewhere classified  Muscle weakness (generalized)  Medical/Referring Diagnosis:    Chronic pain of both shoulders    Referring Physician:  Lorna Li PA MD Orders:  PT Eval and Treat   Return MD Appt:  TBD   Date of Onset:  2024  Allergies:   Penicillins  Restrictions/Precautions:   None      Interventions Planned (Treatment may consist of any combination of the following):  Current Treatment Recommendations: Strengthening; ROM; Neuromuscular re-education; Manual; Home exercise program; Modalities; Therapeutic activities    See Assessment Note    Subjective Comments:   Pt reports that she has been doing her exercises regularly and she has noticed some minor improvements in her strength.   Initial Pain Level:: 0/10  Post Session Pain Level:  0/10, but feeling that R shoulder had been worked out  Medications Last Reviewed: 2025  Updated Objective Findings:  see evaluation from 25     Goals: (Goals have been discussed and agreed upon with patient.)  Short-Term Functional Goals: Time Frame: 2 weeks  Pt will confirm compliance with home program to improve strength and ROM.   Discharge Goals: Time Frame: 4 weeks  Pt

## 2025-01-09 ENCOUNTER — HOSPITAL ENCOUNTER (OUTPATIENT)
Dept: PHYSICAL THERAPY | Age: 60
Setting detail: RECURRING SERIES
Discharge: HOME | End: 2025-01-12
Payer: MEDICARE

## 2025-01-09 PROCEDURE — 97110 THERAPEUTIC EXERCISES: CPT

## 2025-01-09 NOTE — PROGRESS NOTES
10x1# B  Shoulder abduction: x10 B, 10x1# B  Band row: 2x10 blue B  Band pull-apart: 2x10 red B  S/L ER: 2x10x1# B  S/L abduction: 2x10x1# B Table stretch: 3x10   Shoulder flexion: x10 B, 2x10x1# B  Shoulder scaption: x10 B, 2x10x1# B  Shoulder abduction: x10 B, 2x10x1# B  Band row: 3x10 blue B  Band pull-apart: 3x10 red B  S/L ER: 2x10x1# B  S/L abduction: 2x10x1# B  Pt given printout of shoulder flexion, scaption, and abduction      Proprioceptive Activities:                  Manual Therapy:                  Functional Activities:                  1/2/25: S/L ER and abduction, band pull apart    Treatment/Session Summary:    >Treatment Assessment: Pt advanced to greater volume of exercises and tolerated these well.   Communication/Consultation:  None today  Equipment provided today:  1/2/24: red band  Recommendations/Intent for next treatment session: Next visit will focus on improving B shoulder ER, flexion, and abduction strength.    >Total Treatment Billable Duration:  45 minutes  Time In: 0800  Time Out: 0845    Baldevrahat Allison, PT, DPT    Charge Capture  }Post Session Pain  PT Visit Info  [facility] Portal  MD Guidelines  Scanned Media  Benefits  MyChart    Future Appointments   Date Time Provider Department Center   1/14/2025  8:00 AM Keegan, Baldev N, PT [facility]   1/16/2025  8:00 AM Keegan, Baldev N, PT [facility]   1/21/2025  8:00 AM Keegan, Baldev N, PT [facility]   1/23/2025  8:00 AM Keegan, Baldev N, PT [facility]   1/28/2025  8:00 AM Keegan, Baldev N, PT [facility]   1/30/2025  8:00 AM Keegan, Baldev N, PT [facility]   5/5/2025 10:00 AM Vilma Guevara MD [facility]   6/3/2025  8:45 AM [facility] LAB [facility]   6/10/2025  8:30 AM Lorna Li PA [facility]

## 2025-01-14 ENCOUNTER — HOSPITAL ENCOUNTER (OUTPATIENT)
Dept: PHYSICAL THERAPY | Age: 60
Setting detail: RECURRING SERIES
Discharge: HOME | End: 2025-01-17
Payer: MEDICARE

## 2025-01-14 NOTE — PROGRESS NOTES
Yashira Rand  : 1965  Primary: Shayy Medicare-advantage Ppo (Medicare Managed)  Secondary:  [facility] [place]  [place] [address]  Phone: [phone]  Fax: [phone] Plan Frequency: 2/week  Plan of Care/Certification Expiration Date: 25        Plan of Care/Certification Expiration Date:  Plan of Care/Certification Expiration Date: 25    Frequency/Duration: Plan Frequency: 2/week      Time In/Out:   Time In: 0800  Time Out: 0843      PT Visit Info:         Visit Count:  4    OUTPATIENT PHYSICAL THERAPY:   Treatment Note 2025       Episode  (B shoulder pain and weakness)               Treatment Diagnosis:    Acute pain of right shoulder  Acute pain of left shoulder  Stiffness of right shoulder, not elsewhere classified  Muscle weakness (generalized)  Medical/Referring Diagnosis:    Chronic pain of both shoulders    Referring Physician:  Lorna Li PA MD Orders:  PT Eval and Treat   Return MD Appt:  TBD   Date of Onset:  2024  Allergies:   Penicillins  Restrictions/Precautions:   None      Interventions Planned (Treatment may consist of any combination of the following):  Current Treatment Recommendations: Strengthening; ROM; Neuromuscular re-education; Manual; Home exercise program; Modalities; Therapeutic activities    See Assessment Note    Subjective Comments:   Pt reports that her R arm has continued to get stronger she can tell. She did have to take out the garbage over the weekend which was heavy and she could do it, but it did make her arm sore.   Initial Pain Level:: 0/10  Post Session Pain Level:  0/10  Medications Last Reviewed: 2025  Updated Objective Findings:  see evaluation from 25     Goals: (Goals have been discussed and agreed upon with patient.)  Short-Term Functional Goals: Time Frame: 2 weeks  Pt will confirm compliance with home program to improve strength and ROM.   Discharge Goals:

## 2025-01-16 ENCOUNTER — HOSPITAL ENCOUNTER (OUTPATIENT)
Dept: PHYSICAL THERAPY | Age: 60
Setting detail: RECURRING SERIES
Discharge: HOME | End: 2025-01-19
Payer: MEDICARE

## 2025-01-16 PROCEDURE — 97110 THERAPEUTIC EXERCISES: CPT

## 2025-01-16 NOTE — PROGRESS NOTES
Yashira Rand  : 1965  Primary: Shayy Medicare-advantage Ppo (Medicare Managed)  Secondary:  [facility] @ [place]  [place] [address]  Phone: [phone]  Fax: [phone] Plan Frequency: 2/week  Plan of Care/Certification Expiration Date: 25        Plan of Care/Certification Expiration Date:  Plan of Care/Certification Expiration Date: 25    Frequency/Duration: Plan Frequency: 2/week      Time In/Out:   Time In: 805  Time Out: 845      PT Visit Info:         Visit Count:  5    OUTPATIENT PHYSICAL THERAPY:   Treatment Note 2025       Episode  (B shoulder pain and weakness)               Treatment Diagnosis:    Acute pain of right shoulder  Acute pain of left shoulder  Stiffness of right shoulder, not elsewhere classified  Muscle weakness (generalized)  Medical/Referring Diagnosis:    Chronic pain of both shoulders    Referring Physician:  Lorna Li PA MD Orders:  PT Eval and Treat   Return MD Appt:  TBD   Date of Onset:  2024  Allergies:   Penicillins  Restrictions/Precautions:   None      Interventions Planned (Treatment may consist of any combination of the following):  Current Treatment Recommendations: Strengthening; ROM; Neuromuscular re-education; Manual; Home exercise program; Modalities; Therapeutic activities    See Assessment Note    Subjective Comments:   Pt reports that she helped her friend clean a house yesterday which was challenging so her R shoulder is a bit sore.  Initial Pain Level:: 0/10  Post Session Pain Level:  0/10  Medications Last Reviewed: 2025  Updated Objective Findings:  see evaluation from 25     Goals: (Goals have been discussed and agreed upon with patient.)  Short-Term Functional Goals: Time Frame: 2 weeks  Pt will confirm compliance with home program to improve strength and ROM.   Discharge Goals: Time Frame: 4 weeks  Pt will be able to reach across body with R hand to touch

## 2025-01-21 ENCOUNTER — HOSPITAL ENCOUNTER (OUTPATIENT)
Dept: PHYSICAL THERAPY | Age: 60
Setting detail: RECURRING SERIES
Discharge: HOME | End: 2025-01-24
Payer: MEDICARE

## 2025-01-21 PROCEDURE — 97110 THERAPEUTIC EXERCISES: CPT

## 2025-01-21 NOTE — PROGRESS NOTES
Yashira Rand  : 1965  Primary: Shayy Medicare-advantage Ppo (Medicare Managed)  Secondary:  [facility] @ [place]  [place] [address]  Phone: [phone]  Fax: [phone] Plan Frequency: 2/week  Plan of Care/Certification Expiration Date: 25        Plan of Care/Certification Expiration Date:  Plan of Care/Certification Expiration Date: 25    Frequency/Duration: Plan Frequency: 2/week      Time In/Out:   Time In: 805  Time Out: 845      PT Visit Info:         Visit Count:  6    OUTPATIENT PHYSICAL THERAPY:   Treatment Note 2025       Episode  (B shoulder pain and weakness)               Treatment Diagnosis:    Acute pain of right shoulder  Acute pain of left shoulder  Stiffness of right shoulder, not elsewhere classified  Muscle weakness (generalized)  Medical/Referring Diagnosis:    Chronic pain of both shoulders    Referring Physician:  Lorna Li PA MD Orders:  PT Eval and Treat   Return MD Appt:  TBD   Date of Onset:  2024  Allergies:   Penicillins  Restrictions/Precautions:   None      Interventions Planned (Treatment may consist of any combination of the following):  Current Treatment Recommendations: Strengthening; ROM; Neuromuscular re-education; Manual; Home exercise program; Modalities; Therapeutic activities    See Assessment Note    Subjective Comments:   Pt reports that her shoulder was sore last week from the new exercises and cleaning, but it has improved now.   Initial Pain Level:: 0/10  Post Session Pain Level:  0/10  Medications Last Reviewed: 2025  Updated Objective Findings:  see evaluation from 25     Goals: (Goals have been discussed and agreed upon with patient.)  Short-Term Functional Goals: Time Frame: 2 weeks  Pt will confirm compliance with home program to improve strength and ROM.   Discharge Goals: Time Frame: 4 weeks  Pt will be able to reach across body with R hand to touch

## 2025-01-23 ENCOUNTER — HOSPITAL ENCOUNTER (OUTPATIENT)
Dept: PHYSICAL THERAPY | Age: 60
Setting detail: RECURRING SERIES
Discharge: HOME | End: 2025-01-26
Payer: MEDICARE

## 2025-01-23 PROCEDURE — 97110 THERAPEUTIC EXERCISES: CPT

## 2025-01-23 NOTE — PROGRESS NOTES
Yashira Rand  : 1965  Primary: [place] [employer]-advantage Ppo (Medicare Managed)  Secondary:  [facility] [place]  [address]  [place] [address]  Phone: [phone]  Fax: [phone] Plan Frequency: 2/week  Plan of Care/Certification Expiration Date: 25        Plan of Care/Certification Expiration Date:  Plan of Care/Certification Expiration Date: 25    Frequency/Duration: Plan Frequency: 2/week      Time In/Out:   Time In: 08  Time Out: 08      PT Visit Info:         Visit Count:  7    OUTPATIENT PHYSICAL THERAPY:   Treatment and progress Note 2025       Episode  (B shoulder pain and weakness)               Treatment Diagnosis:    Acute pain of right shoulder  Acute pain of left shoulder  Stiffness of right shoulder, not elsewhere classified  Muscle weakness (generalized)  Medical/Referring Diagnosis:    Chronic pain of both shoulders    Referring Physician:  Lorna Li PA MD Orders:  PT Eval and Treat   Return MD Appt:  TBD   Date of Onset:  2024  Allergies:   Penicillins  Restrictions/Precautions:   None      Interventions Planned (Treatment may consist of any combination of the following):  Current Treatment Recommendations: Strengthening; ROM; Neuromuscular re-education; Manual; Home exercise program; Modalities; Therapeutic activities    See Assessment Note    Subjective Comments:   Pt reports that her L shoulder has been giving her no problems now. Her R shoulder has improved but continues to have difficulty with heavy lifting out to side.   Initial Pain Level:: 0/10  Post Session Pain Level:  0/10  Medications Last Reviewed: 2025  Updated Objective Findings:  see evaluation from 25:   L shoulder ROM WNL in all directions with no pain. L shoulder 5/5 flexion, abduction, IR, and ER with no pain  R shoulder reach across body to opposite scapula but mild discomfort if quickly reaching. R hand reach to

## 2025-01-28 ENCOUNTER — HOSPITAL ENCOUNTER (OUTPATIENT)
Dept: PHYSICAL THERAPY | Age: 60
Setting detail: RECURRING SERIES
Discharge: HOME | End: 2025-01-31
Payer: MEDICARE

## 2025-01-28 PROCEDURE — 97110 THERAPEUTIC EXERCISES: CPT

## 2025-01-28 NOTE — PROGRESS NOTES
Yashira Rand  : 1965  Primary: [place] Medicare-advantage Ppo (Medicare Managed)  Secondary:  [facility] [place]  [place] [address]  Phone: [phone]  Fax: [phone] Plan Frequency: 2/week  Plan of Care/Certification Expiration Date: 25        Plan of Care/Certification Expiration Date:  Plan of Care/Certification Expiration Date: 25    Frequency/Duration: Plan Frequency: 2/week      Time In/Out:   Time In: 08  Time Out: 08      PT Visit Info:         Visit Count:  8    OUTPATIENT PHYSICAL THERAPY:   Treatment Note 2025       Episode  (B shoulder pain and weakness)               Treatment Diagnosis:    Acute pain of right shoulder  Acute pain of left shoulder  Stiffness of right shoulder, not elsewhere classified  Muscle weakness (generalized)  Medical/Referring Diagnosis:    Chronic pain of both shoulders    Referring Physician:  Lorna Li PA MD Orders:  PT Eval and Treat   Return MD Appt:  TBD   Date of Onset:  2024  Allergies:   Penicillins  Restrictions/Precautions:   None      Interventions Planned (Treatment may consist of any combination of the following):  Current Treatment Recommendations: Strengthening; ROM; Neuromuscular re-education; Manual; Home exercise program; Modalities; Therapeutic activities    See Assessment Note    Subjective Comments:   Pt reports that her shoulder was less sore from the new exercises.   Initial Pain Level:: 0/10  Post Session Pain Level:  0/10  Medications Last Reviewed: 2025  Updated Objective Findings:  see evaluation from 25:   L shoulder ROM WNL in all directions with no pain. L shoulder 5/5 flexion, abduction, IR, and ER with no pain  R shoulder reach across body to opposite scapula but mild discomfort if quickly reaching. R hand reach to TLJ with mild discomfort.   R shoulder 5/5 IR, 4/5 ER, 4/5 flexion and abduction but no pain with testing

## 2025-01-30 ENCOUNTER — HOSPITAL ENCOUNTER (OUTPATIENT)
Dept: PHYSICAL THERAPY | Age: 60
Setting detail: RECURRING SERIES
End: 2025-01-30
Payer: MEDICARE

## 2025-01-30 PROCEDURE — 97110 THERAPEUTIC EXERCISES: CPT

## 2025-01-30 NOTE — PROGRESS NOTES
Yashira Rand  : 1965  Primary: [place] Medicare-advantage Ppo (Medicare Managed)  Secondary:  [facility] [place]  [place] [address]  Phone: [phone]  Fax: [phone] Plan Frequency: 2/week  Plan of Care/Certification Expiration Date: 25        Plan of Care/Certification Expiration Date:  Plan of Care/Certification Expiration Date: 25    Frequency/Duration: Plan Frequency: 2/week      Time In/Out:   Time In: 0805  Time Out: 0850      PT Visit Info:         Visit Count:  9    OUTPATIENT PHYSICAL THERAPY:   Treatment Note 2025       Episode  (B shoulder pain and weakness)               Treatment Diagnosis:    Acute pain of right shoulder  Acute pain of left shoulder  Stiffness of right shoulder, not elsewhere classified  Muscle weakness (generalized)  Medical/Referring Diagnosis:    Chronic pain of both shoulders    Referring Physician:  Lorna Li PA MD Orders:  PT Eval and Treat   Return MD Appt:  TBD   Date of Onset:  2024  Allergies:   Penicillins  Restrictions/Precautions:   None      Interventions Planned (Treatment may consist of any combination of the following):  Current Treatment Recommendations: Strengthening; ROM; Neuromuscular re-education; Manual; Home exercise program; Modalities; Therapeutic activities    See Assessment Note    Subjective Comments:   Pt reports that her R shoulder is still sore and weak in certain directions but has gotten stronger.   Initial Pain Level:: 0/10  Post Session Pain Level:  0/10  Medications Last Reviewed: 2025  Updated Objective Findings:  see evaluation from 25:   L shoulder ROM WNL in all directions with no pain. L shoulder 5/5 flexion, abduction, IR, and ER with no pain  R shoulder reach across body to opposite scapula but mild discomfort if quickly reaching. R hand reach to TLJ with mild discomfort.   R shoulder 5/5 IR, 4/5 ER, 4/5 flexion and  (visit 6)  1/23/25 (visit 7) PN 1/28/25 (visit 8)  1/30/25 (visit 9)    Modalities:                Therapeutic Exercise: 40 min 40 min 42 min 42 min 45 min    Table stretch: 2x10   Shoulder flexion: 10x1#, 10x2# B  Shoulder scaption: 10x1#, 10x2# B  Shoulder abduction: x10x1#, 1x02# B  Band row: 2x10 blue B  Band pull-apart: 2x10 red B  Band bear hug: 2x10 red B  Band ER: 2x10 red B  Band IR: 2x10 blue B  ER in abduction: 2x10x1# B  Pt given printout for updated home program. Table stretch: 2x10   Shoulder flexion: 10x2# B, 10x3#  Shoulder scaption: 2x10x2# B  Shoulder abduction: x10x2# B  Band row: 2x10 blue B  Band pull-apart: 2x10 red B  Band bear hug: 2x10 red B  Curl: 10x3# B, 10x5# B  Band ER: 2x10 red B  Band IR: 2x10 blue B  ER in abduction: x10x1# B, x10 R red band Table stretch: 2x10   Shoulder flexion: 10x2# B, 10x3#  Shoulder scaption: 2x10x2# B  Shoulder abduction: x10x2# B  Band row: 2x10 blue B  Band pull-apart: 2x10 red B  Band bear hug: 2x10 red B  Curl: 2x10x5# B  Band ER: 2x10 red B  Band IR: 2x10 blue B  ER in abduction: 2x10 R red band  D1 flexion: 2x5 B red  Table stretch: 2x10   Shoulder flexion: 3x10x3#  Shoulder scaption: 3x10x2# B  Shoulder abduction: 3x10x2# B  Band row: 3x10 blue B  Band pull-apart: 3x10 red B  Band bear hug: 2x10 red B  Curl: 3x10x5# B  Band ER: 2x10 red B  Band IR: 2x10 blue B  ER in abduction: 2x10 R red band  D1 flexion: 2x10 B red  Table stretch: 2x10   Shoulder flexion: 2x10x3#  Shoulder scaption: 2x10x3# B  Shoulder abduction: 2x10x3# B  Band row: 2x10 blue B  Band pull-apart: 2x10 red B  Band bear hug: 2x10 red B  Curl: 2x10x5# B  Band ER: 2x10 red B  Band IR: 2x10 blue B  ER in abduction: 2x10 R red band  D1 flexion: 2x10 B red   D2 flexion: 2x5 B red    Proprioceptive Activities:                Manual Therapy:                Functional Activities:                1/2/25: S/L ER and abduction, band pull apart  1/9/25: add shoulder flexion, scaption, and

## 2025-02-04 ENCOUNTER — HOSPITAL ENCOUNTER (OUTPATIENT)
Dept: PHYSICAL THERAPY | Age: 60
Setting detail: RECURRING SERIES
Discharge: HOME | End: 2025-02-07
Payer: MEDICARE

## 2025-02-04 PROCEDURE — 97110 THERAPEUTIC EXERCISES: CPT

## 2025-02-04 NOTE — PROGRESS NOTES
no pain with testing anymore     Goals: (Goals have been discussed and agreed upon with patient.)  Short-Term Functional Goals: Time Frame: 2 weeks  Pt will confirm compliance with home program to improve strength and ROM. MET  Discharge Goals: Time Frame: 4 weeks  Pt will be able to reach across body with R hand to touch opposite scapula without pain to perform ADLs such as bathing and reaching tasks. PROGRESSING  Pt will be able to reach behind back with R hand to touch TLJ without pain to perform ADLs such as dressing and toileting tasks. PROGRESSING  Pt will have 5/5 strength of B Shoulders in all directions to be able to perform ADLs and recreational tasks. PROGRESSING    Treatment   THERAPEUTIC EXERCISE: (see chart below for minutes):    Exercises per grid below to improve mobility and strength.  Required minimal visual, verbal, and manual cues to promote proper body alignment and promote proper body mechanics.  Progressed resistance, range, repetitions, and complexity of movement as indicated.  THERAPEUTIC ACTIVITY: ( see chart below for minutes):    Therapeutic activities per grid below to improve mobility, strength, and coordination.  Required minimal visual, verbal, and manual cues to promote coordination of bilateral, upper extremity(s).  NEUROMUSCULAR RE-EDUCATION: (see chart below for minutes):    Exercise/activities per grid below to improve balance and coordination.  Required minimal visual, verbal, and manual cues to promote motor control of bilateral, upper extremity(s).  MANUAL THERAPY: (see chart below for minutes):   Joint mobilization and Soft tissue mobilization was utilized and necessary because of the patient's restricted joint motion, painful spasm, loss of articular motion, and restricted motion of soft tissue.   MODALITIES:        *  Cold Pack Therapy in order to provide analgesia, relieve muscle spasm, and reduce inflammation and edema.     Date: 1/28/25 (visit 8)  1/30/25 (visit 9)

## 2025-02-06 ENCOUNTER — HOSPITAL ENCOUNTER (OUTPATIENT)
Dept: PHYSICAL THERAPY | Age: 60
Setting detail: RECURRING SERIES
Discharge: HOME | End: 2025-02-09
Payer: MEDICARE

## 2025-02-06 PROCEDURE — 97110 THERAPEUTIC EXERCISES: CPT

## 2025-02-06 NOTE — PROGRESS NOTES
Yashira Rand  : 1965  Primary: [employer]ayy Medicare-advantage Ppo (Medicare Managed)  Secondary:  [facility] [place]  [place] [address]  Phone: [phone]  Fax: [phone] Plan Frequency: 2/week  Plan of Care/Certification Expiration Date: 25        Plan of Care/Certification Expiration Date:  Plan of Care/Certification Expiration Date: 25    Frequency/Duration: Plan Frequency: 2/week      Time In/Out:   Time In: 805  Time Out: 845      PT Visit Info:         Visit Count:  11    OUTPATIENT PHYSICAL THERAPY:   Treatment Note 2025       Episode  (B shoulder pain and weakness)               Treatment Diagnosis:    Acute pain of right shoulder  Acute pain of left shoulder  Stiffness of right shoulder, not elsewhere classified  Muscle weakness (generalized)  Medical/Referring Diagnosis:    Chronic pain of both shoulders    Referring Physician:  Lorna Li PA MD Orders:  PT Eval and Treat   Return MD Appt:  TBD   Date of Onset:  2024  Allergies:   Penicillins  Restrictions/Precautions:   None      Interventions Planned (Treatment may consist of any combination of the following):  Current Treatment Recommendations: Strengthening; ROM; Neuromuscular re-education; Manual; Home exercise program; Modalities; Therapeutic activities    See Assessment Note    Subjective Comments:   Pt reports that her R shoulder continues to feel stronger. She still feels weakness reaching overhead and behind body such as pulling vacuum backwards.  Initial Pain Level:: 0/10  Post Session Pain Level:  0/10  Medications Last Reviewed: 2025  Updated Objective Findings:  see evaluation from 25:   L shoulder ROM WNL in all directions with no pain. L shoulder 5/5 flexion, abduction, IR, and ER with no pain  R shoulder reach across body to opposite scapula but mild discomfort if quickly reaching. R hand reach to TLJ with mild discomfort.

## 2025-02-11 ENCOUNTER — HOSPITAL ENCOUNTER (OUTPATIENT)
Dept: PHYSICAL THERAPY | Age: 60
Setting detail: RECURRING SERIES
Discharge: HOME | End: 2025-02-14
Payer: MEDICARE

## 2025-02-11 PROCEDURE — 97110 THERAPEUTIC EXERCISES: CPT

## 2025-02-11 NOTE — PROGRESS NOTES
Yashira Rand  : 1965  Primary: [place] Medicare-advantage Ppo (Medicare Managed)  Secondary:  [facility] [place]  [place] [address]  Phone: [phone]  Fax: [phone] Plan Frequency: 2/week  Plan of Care/Certification Expiration Date: 25        Plan of Care/Certification Expiration Date:  Plan of Care/Certification Expiration Date: 25    Frequency/Duration: Plan Frequency: 2/week      Time In/Out:   Time In: 08  Time Out: 08      PT Visit Info:         Visit Count:  12    OUTPATIENT PHYSICAL THERAPY:   Treatment Note 2025       Episode  (B shoulder pain and weakness)               Treatment Diagnosis:    Acute pain of right shoulder  Acute pain of left shoulder  Stiffness of right shoulder, not elsewhere classified  Muscle weakness (generalized)  Medical/Referring Diagnosis:    Chronic pain of both shoulders    Referring Physician:  Lorna Li PA MD Orders:  PT Eval and Treat   Return MD Appt:  TBD   Date of Onset:  2024  Allergies:   Penicillins  Restrictions/Precautions:   None      Interventions Planned (Treatment may consist of any combination of the following):  Current Treatment Recommendations: Strengthening; ROM; Neuromuscular re-education; Manual; Home exercise program; Modalities; Therapeutic activities    See Assessment Note    Subjective Comments:   Pt reports that her shoulder continues to feel stronger. It popped over the weekend, but wasn't painful.   Initial Pain Level:: 0/10  Post Session Pain Level:  0/10  Medications Last Reviewed: 2025  Updated Objective Findings:  see evaluation from 25:   L shoulder ROM WNL in all directions with no pain. L shoulder 5/5 flexion, abduction, IR, and ER with no pain  R shoulder reach across body to opposite scapula but mild discomfort if quickly reaching. R hand reach to TLJ with mild discomfort.   R shoulder 5/5 IR, 4/5 ER, 4/5 flexion

## 2025-02-13 ENCOUNTER — HOSPITAL ENCOUNTER (OUTPATIENT)
Dept: PHYSICAL THERAPY | Age: 60
Setting detail: RECURRING SERIES
Discharge: HOME | End: 2025-02-16
Payer: MEDICARE

## 2025-02-13 PROCEDURE — 97110 THERAPEUTIC EXERCISES: CPT

## 2025-02-13 NOTE — PROGRESS NOTES
Yashira aRnd  : 1965  Primary: [place]ayy Medicare-advantage Ppo (Medicare Managed)  Secondary:  [facility] [place] [facility] [place]  [place] [address]  Phone: [phone]  Fax: [phone] Plan Frequency: 2/week  Plan of Care/Certification Expiration Date: 25        Plan of Care/Certification Expiration Date:  Plan of Care/Certification Expiration Date: 25    Frequency/Duration: Plan Frequency: 2/week      Time In/Out:   Time In: 08  Time Out: 0845      PT Visit Info:         Visit Count:  13    OUTPATIENT PHYSICAL THERAPY:   Treatment Note 2025       Episode  (B shoulder pain and weakness)               Treatment Diagnosis:    Acute pain of right shoulder  Acute pain of left shoulder  Stiffness of right shoulder, not elsewhere classified  Muscle weakness (generalized)  Medical/Referring Diagnosis:    Chronic pain of both shoulders    Referring Physician:  Lorna Li PA MD Orders:  PT Eval and Treat   Return MD Appt:  TBD   Date of Onset:  2024  Allergies:   Penicillins  Restrictions/Precautions:   None      Interventions Planned (Treatment may consist of any combination of the following):  Current Treatment Recommendations: Strengthening; ROM; Neuromuscular re-education; Manual; Home exercise program; Modalities; Therapeutic activities    See Assessment Note    Subjective Comments:   Pt reports that her shoulder was a bit sore from the exercises last session but not painful.   Initial Pain Level:: 0/10  Post Session Pain Level:  0/10  Medications Last Reviewed: 2025  Updated Objective Findings:  see evaluation from 25:   L shoulder ROM WNL in all directions with no pain. L shoulder 5/5 flexion, abduction, IR, and ER with no pain  R shoulder reach across body to opposite scapula but mild discomfort if quickly reaching. R hand reach to TLJ with mild discomfort.   R shoulder 5/5 IR, 4/5 ER, 4/5 flexion and abduction

## 2025-02-18 ENCOUNTER — APPOINTMENT (OUTPATIENT)
Dept: PHYSICAL THERAPY | Age: 60
End: 2025-02-18
Payer: MEDICARE

## 2025-02-20 ENCOUNTER — APPOINTMENT (OUTPATIENT)
Dept: PHYSICAL THERAPY | Age: 60
End: 2025-02-20
Payer: MEDICARE

## 2025-02-25 ENCOUNTER — HOSPITAL ENCOUNTER (OUTPATIENT)
Dept: PHYSICAL THERAPY | Age: 60
Setting detail: RECURRING SERIES
Discharge: HOME | End: 2025-02-28
Payer: MEDICARE

## 2025-02-25 PROCEDURE — 97110 THERAPEUTIC EXERCISES: CPT

## 2025-02-25 NOTE — PROGRESS NOTES
Yashira Rand  : 1965  Primary: [place]ayy Medicare-advantage Ppo (Medicare Managed)  Secondary:  [facility] [place]  [place] [address]  Phone: [phone]  Fax: [phone] Plan Frequency: 2/week  Plan of Care/Certification Expiration Date: 25        Plan of Care/Certification Expiration Date:  Plan of Care/Certification Expiration Date: 25    Frequency/Duration: Plan Frequency: 2/week      Time In/Out:   Time In: 0800  Time Out: 0845      PT Visit Info:         Visit Count:  14    OUTPATIENT PHYSICAL THERAPY:   Treatment Note 2025       Episode  (B shoulder pain and weakness)               Treatment Diagnosis:    Acute pain of right shoulder  Acute pain of left shoulder  Stiffness of right shoulder, not elsewhere classified  Muscle weakness (generalized)  Medical/Referring Diagnosis:    Chronic pain of both shoulders    Referring Physician:  Lorna Li PA MD Orders:  PT Eval and Treat   Return MD Appt:  TBD   Date of Onset:  2024  Allergies:   Penicillins  Restrictions/Precautions:   None      Interventions Planned (Treatment may consist of any combination of the following):  Current Treatment Recommendations: Strengthening; ROM; Neuromuscular re-education; Manual; Home exercise program; Modalities; Therapeutic activities    See Assessment Note    Subjective Comments:   Pt reports that she was sick last week. She continued her exercises at home though as she could.   Initial Pain Level:: 0/10  Post Session Pain Level:  0/10  Medications Last Reviewed: 2025  Updated Objective Findings:  see evaluation from 25:   L shoulder ROM WNL in all directions with no pain. L shoulder 5/5 flexion, abduction, IR, and ER with no pain  R shoulder reach across body to opposite scapula but mild discomfort if quickly reaching. R hand reach to TLJ with mild discomfort.   R shoulder 5/5 IR, 4/5 ER, 4/5 flexion and

## 2025-02-27 ENCOUNTER — HOSPITAL ENCOUNTER (OUTPATIENT)
Dept: PHYSICAL THERAPY | Age: 60
Setting detail: RECURRING SERIES
End: 2025-02-27
Payer: MEDICARE

## 2025-02-27 PROCEDURE — 97110 THERAPEUTIC EXERCISES: CPT

## 2025-02-27 NOTE — PROGRESS NOTES
Yashira Rand  : 1965  Primary: [place] Medicare-advantage Ppo (Medicare Managed)  Secondary:  [facility] [place]  [address]  [place] [address]  Phone: [phone]  Fax: [phone] Plan Frequency: 2/week  Plan of Care/Certification Expiration Date: 25        Plan of Care/Certification Expiration Date:  Plan of Care/Certification Expiration Date: 25    Frequency/Duration: Plan Frequency: 2/week      Time In/Out:   Time In: 0800  Time Out: 0845      PT Visit Info:         Visit Count:  15    OUTPATIENT PHYSICAL THERAPY:   Treatment and discharge Note 2025       Episode  (B shoulder pain and weakness)               Treatment Diagnosis:    Acute pain of right shoulder  Acute pain of left shoulder  Stiffness of right shoulder, not elsewhere classified  Muscle weakness (generalized)  Medical/Referring Diagnosis:    Chronic pain of both shoulders    Referring Physician:  Lorna Li PA MD Orders:  PT Eval and Treat   Return MD Appt:  TBD   Date of Onset:  2024  Allergies:   Penicillins  Restrictions/Precautions:   None      Interventions Planned (Treatment may consist of any combination of the following):  Current Treatment Recommendations: Strengthening; ROM; Neuromuscular re-education; Manual; Home exercise program; Modalities; Therapeutic activities    See Assessment Note    Subjective Comments:   Pt reports that her arm has been feeling well and she has continued her exercises.   Initial Pain Level:: 0/10  Post Session Pain Level:  0/10  Medications Last Reviewed: 2025  Updated Objective Findings:  see evaluation from 25:   L shoulder ROM WNL in all directions with no pain. L shoulder 5/5 flexion, abduction, IR, and ER with no pain  R shoulder reach across body to opposite scapula but mild discomfort if quickly reaching. R hand reach to TLJ with mild discomfort.   R shoulder 5/5 IR, 4/5 ER, 4/5 flexion and  apart  1/9/25: add shoulder flexion, scaption, and abduction  1/16/25: add band ER, band IR, and band row    Treatment/Session Summary:    >Treatment Assessment: Pt tolerated exercises well with no pain. She has met all her goals and will be discharged to home program at this time. She was satisfied with her progress and agreed to this plan.   Communication/Consultation:  None today  Equipment provided today:  1/2/24: red band. 1/16/25: blue band  Recommendations: D/C to home program    >Total Treatment Billable Duration:  45 minutes  Time In: 0800  Time Out: 0845    Baldev Allison, PT, DPT    Charge Capture  }Post Session Pain  PT Visit Info  Portable Zoo Portal  MD Guidelines  Scanned Media  Benefits  MyChart    Future Appointments   Date Time Provider Department Center   5/5/2025 10:00 AM Vilma Guevara MD [facility] AMB   6/3/2025  8:45 AM [facility] LAB [facility] ECC DEP   6/10/2025  8:30 AM Lorna Li PA [facility] DEP

## 2025-05-05 ENCOUNTER — OFFICE VISIT (OUTPATIENT)
Dept: NEUROLOGY | Age: 60
End: 2025-05-05
Payer: MEDICARE

## 2025-05-05 VITALS
SYSTOLIC BLOOD PRESSURE: 143 MMHG | OXYGEN SATURATION: 97 % | DIASTOLIC BLOOD PRESSURE: 70 MMHG | WEIGHT: 201 LBS | BODY MASS INDEX: 35.61 KG/M2 | HEART RATE: 70 BPM

## 2025-05-05 DIAGNOSIS — G43.109 MIGRAINE WITH AURA AND WITHOUT STATUS MIGRAINOSUS, NOT INTRACTABLE: Primary | ICD-10-CM

## 2025-05-05 DIAGNOSIS — M54.2 CERVICALGIA: ICD-10-CM

## 2025-05-05 DIAGNOSIS — Z79.899 MEDICATION MANAGEMENT: ICD-10-CM

## 2025-05-05 DIAGNOSIS — Z71.9 HEALTH EDUCATION/COUNSELING: ICD-10-CM

## 2025-05-05 DIAGNOSIS — M47.812 CERVICAL SPONDYLOSIS WITHOUT MYELOPATHY: ICD-10-CM

## 2025-05-05 PROCEDURE — 3077F SYST BP >= 140 MM HG: CPT | Performed by: PSYCHIATRY & NEUROLOGY

## 2025-05-05 PROCEDURE — 3078F DIAST BP <80 MM HG: CPT | Performed by: PSYCHIATRY & NEUROLOGY

## 2025-05-05 PROCEDURE — 99215 OFFICE O/P EST HI 40 MIN: CPT | Performed by: PSYCHIATRY & NEUROLOGY

## 2025-05-05 NOTE — PROGRESS NOTES
[facility] NEUROLOGY FOLLOW-UP NOTE    Patient: Yashira Rand  Physician: Vilma Guevara MD    CC:   Chief Complaint   Patient presents with    Follow-up    Migraine     Migraine last week that lasted 5 days       PCP: Lorna Li PA  Referring Provider: Vilma Guevara MD    History of Present Illness:     Interval History on 5/5/2025:  History of Present Illness  Yashira Rand is a 60-year-old female presenting for follow-up of migraine headaches.    Her current regimen of Aimovig and Nurtec remains effective. She receives injections every 3 months from her pain management specialist. As the 3-month isidro approaches, she experiences neck pain and tightness, followed by migraines. Last week, she had a 5-day migraine episode which was only partially relived by Nurtec. Typically, she experiences migraines every 4 to 5 weeks, each lasting 2 to 3 days. Near her next injection, she may have consecutive migraines. The week prior to her last migraine, she had visual disturbances managed with Nurtec, preventing a full-blown migraine.    She is satisfied with Aimovig and Nurtec, noting weekly migraines before these treatments. She has had migraines since 1990 and notes tolerance to medications over time, necessitating changes in treatment. She previously tried Maxalt and Frova, and currently takes Phenergan for nausea and magnesium supplements. She has an upcoming appointment with her pain management specialist on 05/28/2025 for her next injection. She is enrolled in a patient assistance program for Aimovig and uses an he to track her migraines.    For neck pain, she takes Norco as needed and Zanaflex nightly in addition     Interval History on 4/29/2024:  Yashira Rand is a 59 y.o. right-handed female who presents for follow-up management of chronic migraine headaches.  Patient is doing well on combination of Aimovig 140mg as a preventive therapy and Nurtec as needed for

## 2025-06-03 ENCOUNTER — LAB (OUTPATIENT)
Dept: FAMILY MEDICINE CLINIC | Facility: CLINIC | Age: 60
End: 2025-06-03

## 2025-06-03 DIAGNOSIS — E78.5 DYSLIPIDEMIA: ICD-10-CM

## 2025-06-03 DIAGNOSIS — I10 ESSENTIAL HYPERTENSION: ICD-10-CM

## 2025-06-03 LAB
ALBUMIN SERPL-MCNC: 3.8 G/DL (ref 3.5–5)
ALBUMIN/GLOB SERPL: 1.1 (ref 1–1.9)
ALP SERPL-CCNC: 84 U/L (ref 35–104)
ALT SERPL-CCNC: 28 U/L (ref 8–45)
ANION GAP SERPL CALC-SCNC: 13 MMOL/L (ref 7–16)
AST SERPL-CCNC: 37 U/L (ref 15–37)
BILIRUB SERPL-MCNC: 0.2 MG/DL (ref 0–1.2)
BUN SERPL-MCNC: 25 MG/DL (ref 6–23)
CALCIUM SERPL-MCNC: 10.4 MG/DL (ref 8.8–10.2)
CHLORIDE SERPL-SCNC: 107 MMOL/L (ref 98–107)
CHOLEST SERPL-MCNC: 148 MG/DL (ref 0–200)
CO2 SERPL-SCNC: 23 MMOL/L (ref 20–29)
CREAT SERPL-MCNC: 0.93 MG/DL (ref 0.6–1.1)
CREAT UR-MCNC: 142 MG/DL (ref 28–217)
GLOBULIN SER CALC-MCNC: 3.5 G/DL (ref 2.3–3.5)
GLUCOSE SERPL-MCNC: 87 MG/DL (ref 70–99)
HDLC SERPL-MCNC: 52 MG/DL (ref 40–60)
HDLC SERPL: 2.9 (ref 0–5)
LDLC SERPL CALC-MCNC: 79 MG/DL (ref 0–100)
MICROALBUMIN UR-MCNC: <1.2 MG/DL (ref 0–20)
MICROALBUMIN/CREAT UR-RTO: NORMAL MG/G (ref 0–30)
POTASSIUM SERPL-SCNC: 4.2 MMOL/L (ref 3.5–5.1)
PROT SERPL-MCNC: 7.3 G/DL (ref 6.3–8.2)
SODIUM SERPL-SCNC: 144 MMOL/L (ref 136–145)
TRIGL SERPL-MCNC: 86 MG/DL (ref 0–150)
VLDLC SERPL CALC-MCNC: 17 MG/DL (ref 6–23)

## 2025-06-04 ENCOUNTER — RESULTS FOLLOW-UP (OUTPATIENT)
Dept: FAMILY MEDICINE CLINIC | Facility: CLINIC | Age: 60
End: 2025-06-04

## 2025-06-09 ASSESSMENT — PATIENT HEALTH QUESTIONNAIRE - PHQ9
SUM OF ALL RESPONSES TO PHQ QUESTIONS 1-9: 0
SUM OF ALL RESPONSES TO PHQ QUESTIONS 1-9: 0
2. FEELING DOWN, DEPRESSED OR HOPELESS: NOT AT ALL
1. LITTLE INTEREST OR PLEASURE IN DOING THINGS: NOT AT ALL
SUM OF ALL RESPONSES TO PHQ QUESTIONS 1-9: 0
SUM OF ALL RESPONSES TO PHQ QUESTIONS 1-9: 0

## 2025-06-09 ASSESSMENT — LIFESTYLE VARIABLES
HOW MANY STANDARD DRINKS CONTAINING ALCOHOL DO YOU HAVE ON A TYPICAL DAY: PATIENT DOES NOT DRINK
HOW OFTEN DO YOU HAVE A DRINK CONTAINING ALCOHOL: NEVER

## 2025-06-09 NOTE — ASSESSMENT & PLAN NOTE
BP today is: well controlled.    Currently prescribed:   Hypertension Medications       Antihypertensive Combinations       lisinopril-hydroCHLOROthiazide (PRINZIDE;ZESTORETIC) 10-12.5 MG per tablet Take 1 tablet by mouth daily       Thiazides and Thiazide-Like Diuretics       indapamide (LOZOL) 2.5 MG tablet Take 1 tablet by mouth daily   Orders:    indapamide (LOZOL) 2.5 MG tablet; Take 1 tablet by mouth daily    lisinopril-hydroCHLOROthiazide (PRINZIDE;ZESTORETIC) 10-12.5 MG per tablet; Take 1 tablet by mouth daily

## 2025-06-09 NOTE — PROGRESS NOTES
Yashria Rand (: 1965) is a 60 y.o. female, an established patient, is here for evaluation of the following chief complaint(s):  Chief Complaint   Patient presents with    Medicare AWV    Hypertension    Hyperlipidemia          ASSESSMENT/PLAN:    Assessment & Plan  Medicare annual wellness visit, subsequent          Essential hypertension     BP today is: well controlled.    Currently prescribed:   Hypertension Medications       Antihypertensive Combinations       lisinopril-hydroCHLOROthiazide (PRINZIDE;ZESTORETIC) 10-12.5 MG per tablet Take 1 tablet by mouth daily       Thiazides and Thiazide-Like Diuretics       indapamide (LOZOL) 2.5 MG tablet Take 1 tablet by mouth daily   Orders:    indapamide (LOZOL) 2.5 MG tablet; Take 1 tablet by mouth daily    lisinopril-hydroCHLOROthiazide (PRINZIDE;ZESTORETIC) 10-12.5 MG per tablet; Take 1 tablet by mouth daily    Dyslipidemia     The 10-year ASCVD risk score (Kayla MICHAUD, et al., 2019) is: 2.5%    Values used to calculate the score:      Age: 60 years      Sex: Female      Is Non- : No      Diabetic: No      Tobacco smoker: No      Systolic Blood Pressure: 120 mmHg      Is BP treated: Yes      HDL Cholesterol: 52 MG/DL      Total Cholesterol: 148 MG/DL  Currently prescribed:   Lipid Lowering Medications       Bile Acid Sequestrants       colestipol (COLESTID) 1 g tablet Take 1 tablet by mouth 2 times daily       Antihyperlipidemics - Combinations       ezetimibe-simvastatin (VYTORIN) 10-20 MG per tablet Take 1 tablet by mouth nightly   Orders:    ezetimibe-simvastatin (VYTORIN) 10-20 MG per tablet; Take 1 tablet by mouth nightly    Chronic diarrhea   Chronic, at goal (stable), continue current treatment plan    Orders:    colestipol (COLESTID) 1 g tablet; Take 1 tablet by mouth 2 times daily    Hypercalcemia     Her calcium level has decreased a little, but does remain elevated.  If it remains the same w/ her next set of labs,

## 2025-06-10 ENCOUNTER — OFFICE VISIT (OUTPATIENT)
Dept: FAMILY MEDICINE CLINIC | Facility: CLINIC | Age: 60
End: 2025-06-10
Payer: MEDICARE

## 2025-06-10 VITALS
WEIGHT: 197.2 LBS | BODY MASS INDEX: 34.94 KG/M2 | SYSTOLIC BLOOD PRESSURE: 120 MMHG | HEIGHT: 63 IN | OXYGEN SATURATION: 100 % | TEMPERATURE: 98 F | DIASTOLIC BLOOD PRESSURE: 70 MMHG | HEART RATE: 79 BPM

## 2025-06-10 DIAGNOSIS — K52.9 CHRONIC DIARRHEA: ICD-10-CM

## 2025-06-10 DIAGNOSIS — E87.6 HYPOKALEMIA: ICD-10-CM

## 2025-06-10 DIAGNOSIS — E78.5 DYSLIPIDEMIA: ICD-10-CM

## 2025-06-10 DIAGNOSIS — M54.50 CHRONIC LEFT-SIDED LOW BACK PAIN WITHOUT SCIATICA: ICD-10-CM

## 2025-06-10 DIAGNOSIS — I10 ESSENTIAL HYPERTENSION: ICD-10-CM

## 2025-06-10 DIAGNOSIS — F41.9 ANXIETY: ICD-10-CM

## 2025-06-10 DIAGNOSIS — B35.4 TINEA CORPORIS: ICD-10-CM

## 2025-06-10 DIAGNOSIS — M54.2 CERVICALGIA: ICD-10-CM

## 2025-06-10 DIAGNOSIS — E83.52 HYPERCALCEMIA: ICD-10-CM

## 2025-06-10 DIAGNOSIS — G89.29 CHRONIC LEFT-SIDED LOW BACK PAIN WITHOUT SCIATICA: ICD-10-CM

## 2025-06-10 DIAGNOSIS — Z00.00 MEDICARE ANNUAL WELLNESS VISIT, SUBSEQUENT: Primary | ICD-10-CM

## 2025-06-10 PROCEDURE — G2211 COMPLEX E/M VISIT ADD ON: HCPCS | Performed by: PHYSICIAN ASSISTANT

## 2025-06-10 PROCEDURE — G0439 PPPS, SUBSEQ VISIT: HCPCS | Performed by: PHYSICIAN ASSISTANT

## 2025-06-10 PROCEDURE — 3078F DIAST BP <80 MM HG: CPT | Performed by: PHYSICIAN ASSISTANT

## 2025-06-10 PROCEDURE — 3074F SYST BP LT 130 MM HG: CPT | Performed by: PHYSICIAN ASSISTANT

## 2025-06-10 PROCEDURE — 99213 OFFICE O/P EST LOW 20 MIN: CPT | Performed by: PHYSICIAN ASSISTANT

## 2025-06-10 RX ORDER — INDAPAMIDE 2.5 MG/1
2.5 TABLET ORAL DAILY
Qty: 90 TABLET | Refills: 1 | Status: SHIPPED | OUTPATIENT
Start: 2025-06-10

## 2025-06-10 RX ORDER — LISINOPRIL AND HYDROCHLOROTHIAZIDE 10; 12.5 MG/1; MG/1
1 TABLET ORAL DAILY
Qty: 90 TABLET | Refills: 1 | Status: SHIPPED | OUTPATIENT
Start: 2025-06-10

## 2025-06-10 RX ORDER — COLESTIPOL HYDROCHLORIDE 1 G/1
1 TABLET ORAL 2 TIMES DAILY
Qty: 180 TABLET | Refills: 1 | Status: SHIPPED | OUTPATIENT
Start: 2025-06-10

## 2025-06-10 RX ORDER — NYSTATIN AND TRIAMCINOLONE ACETONIDE 100000; 1 [USP'U]/G; MG/G
CREAM TOPICAL
Qty: 30 G | Refills: 5 | Status: SHIPPED | OUTPATIENT
Start: 2025-06-10

## 2025-06-10 RX ORDER — EZETIMIBE AND SIMVASTATIN 10; 20 MG/1; MG/1
1 TABLET ORAL NIGHTLY
Qty: 90 TABLET | Refills: 1 | Status: SHIPPED | OUTPATIENT
Start: 2025-06-10

## 2025-06-10 RX ORDER — POTASSIUM CHLORIDE 1500 MG/1
20 TABLET, EXTENDED RELEASE ORAL 2 TIMES DAILY
Qty: 180 TABLET | Refills: 1 | Status: SHIPPED | OUTPATIENT
Start: 2025-06-10

## 2025-06-10 RX ORDER — LORAZEPAM 1 MG/1
1 TABLET ORAL DAILY PRN
Qty: 10 TABLET | Refills: 5 | Status: SHIPPED | OUTPATIENT
Start: 2025-06-10 | End: 2025-12-07

## 2025-06-10 SDOH — HEALTH STABILITY: PHYSICAL HEALTH: ON AVERAGE, HOW MANY MINUTES DO YOU ENGAGE IN EXERCISE AT THIS LEVEL?: 30 MIN

## 2025-06-10 SDOH — ECONOMIC STABILITY: TRANSPORTATION INSECURITY
IN THE PAST 12 MONTHS, HAS THE LACK OF TRANSPORTATION KEPT YOU FROM MEDICAL APPOINTMENTS OR FROM GETTING MEDICATIONS?: NO

## 2025-06-10 SDOH — HEALTH STABILITY: PHYSICAL HEALTH: ON AVERAGE, HOW MANY DAYS PER WEEK DO YOU ENGAGE IN MODERATE TO STRENUOUS EXERCISE (LIKE A BRISK WALK)?: 3 DAYS

## 2025-06-10 SDOH — ECONOMIC STABILITY: INCOME INSECURITY: IN THE LAST 12 MONTHS, WAS THERE A TIME WHEN YOU WERE NOT ABLE TO PAY THE MORTGAGE OR RENT ON TIME?: PATIENT DECLINED

## 2025-06-10 SDOH — ECONOMIC STABILITY: FOOD INSECURITY: WITHIN THE PAST 12 MONTHS, THE FOOD YOU BOUGHT JUST DIDN'T LAST AND YOU DIDN'T HAVE MONEY TO GET MORE.: PATIENT DECLINED

## 2025-06-10 SDOH — ECONOMIC STABILITY: FOOD INSECURITY: WITHIN THE PAST 12 MONTHS, YOU WORRIED THAT YOUR FOOD WOULD RUN OUT BEFORE YOU GOT MONEY TO BUY MORE.: PATIENT DECLINED

## 2025-06-10 ASSESSMENT — PATIENT HEALTH QUESTIONNAIRE - PHQ9
SUM OF ALL RESPONSES TO PHQ QUESTIONS 1-9: 1
SUM OF ALL RESPONSES TO PHQ QUESTIONS 1-9: 1
2. FEELING DOWN, DEPRESSED OR HOPELESS: SEVERAL DAYS
SUM OF ALL RESPONSES TO PHQ QUESTIONS 1-9: 1
SUM OF ALL RESPONSES TO PHQ QUESTIONS 1-9: 1
1. LITTLE INTEREST OR PLEASURE IN DOING THINGS: NOT AT ALL

## 2025-06-10 ASSESSMENT — LIFESTYLE VARIABLES
HOW OFTEN DO YOU HAVE A DRINK CONTAINING ALCOHOL: 1
HOW MANY STANDARD DRINKS CONTAINING ALCOHOL DO YOU HAVE ON A TYPICAL DAY: PATIENT DOES NOT DRINK
HOW OFTEN DO YOU HAVE A DRINK CONTAINING ALCOHOL: NEVER
HOW MANY STANDARD DRINKS CONTAINING ALCOHOL DO YOU HAVE ON A TYPICAL DAY: 0
HOW OFTEN DO YOU HAVE SIX OR MORE DRINKS ON ONE OCCASION: 1

## 2025-06-10 NOTE — ASSESSMENT & PLAN NOTE
Her calcium level has decreased a little, but does remain elevated.  If it remains the same w/ her next set of labs, will need to obtain add'l testing.

## 2025-06-10 NOTE — ASSESSMENT & PLAN NOTE
Pt to ask Pain Management to start writing Zanaflex for her chronic neck pain at her next visit w/ them.     Orders:    tiZANidine (ZANAFLEX) 4 MG tablet; Takes 1 tablet at bedtime

## 2025-06-10 NOTE — ASSESSMENT & PLAN NOTE
Chronic, at goal (stable), continue current treatment plan    Orders:    colestipol (COLESTID) 1 g tablet; Take 1 tablet by mouth 2 times daily

## 2025-06-10 NOTE — ASSESSMENT & PLAN NOTE
Chronic, at goal (stable), continue current treatment plan    Orders:    potassium chloride (KLOR-CON M) 20 MEQ extended release tablet; Take 1 tablet by mouth 2 times daily

## 2025-06-10 NOTE — ASSESSMENT & PLAN NOTE
The 10-year ASCVD risk score (Kayla MICHAUD, et al., 2019) is: 2.5%    Values used to calculate the score:      Age: 60 years      Sex: Female      Is Non- : No      Diabetic: No      Tobacco smoker: No      Systolic Blood Pressure: 120 mmHg      Is BP treated: Yes      HDL Cholesterol: 52 MG/DL      Total Cholesterol: 148 MG/DL  Currently prescribed:   Lipid Lowering Medications       Bile Acid Sequestrants       colestipol (COLESTID) 1 g tablet Take 1 tablet by mouth 2 times daily       Antihyperlipidemics - Combinations       ezetimibe-simvastatin (VYTORIN) 10-20 MG per tablet Take 1 tablet by mouth nightly   Orders:    ezetimibe-simvastatin (VYTORIN) 10-20 MG per tablet; Take 1 tablet by mouth nightly

## 2025-06-10 NOTE — ASSESSMENT & PLAN NOTE
Chronic, at goal (stable), continue current treatment plan.  Typically only needs about 10 tabs/month.     Orders:    LORazepam (ATIVAN) 1 MG tablet; Take 1 tablet by mouth daily as needed for Anxiety for up to 180 days. (Usually needs 2-3x/month) Max Daily Amount: 1 mg

## 2025-06-10 NOTE — ASSESSMENT & PLAN NOTE
Chronic, at goal (stable), continue current treatment plan    Orders:    nystatin-triamcinolone (MYCOLOG II) 369265-2.1 UNIT/GM-% cream; Apply topically under belly once weekly as needed

## (undated) DEVICE — DEVICE ULTRAGUIDE TFR HANDHELD SINGLE USE

## (undated) DEVICE — STRIP,CLOSURE,WOUND,MEDI-STRIP,1/2X4: Brand: MEDLINE

## (undated) DEVICE — DRESSING,GAUZE,XEROFORM,CURAD,1"X8",ST: Brand: CURAD

## (undated) DEVICE — BNDG,ELSTC,MATRIX,STRL,2"X5YD,LF,HOOK&LP: Brand: MEDLINE

## (undated) DEVICE — ZIMMER® STERILE DISPOSABLE TOURNIQUET CUFF WITH PLC, DUAL PORT, SINGLE BLADDER, 18 IN. (46 CM)

## (undated) DEVICE — DRAPE,U/SHT,SPLIT,FILM,60X84,STERILE: Brand: MEDLINE

## (undated) DEVICE — PADDING CAST W3INXL4YD COT BLEND MIC PLEAT UNDERCAST SPEC

## (undated) DEVICE — GLOVE SURG SZ 65 THK91MIL LTX FREE SYN POLYISOPRENE

## (undated) DEVICE — HAND PACK: Brand: MEDLINE INDUSTRIES, INC.

## (undated) DEVICE — GLOVE SURG SZ 65 L12IN FNGR THK79MIL GRN LTX FREE

## (undated) DEVICE — DISPOSABLE BIPOLAR CODE, 12' (3.66 M): Brand: CONMED